# Patient Record
Sex: MALE | Race: OTHER | URBAN - METROPOLITAN AREA
[De-identification: names, ages, dates, MRNs, and addresses within clinical notes are randomized per-mention and may not be internally consistent; named-entity substitution may affect disease eponyms.]

---

## 2021-06-22 ENCOUNTER — INPATIENT (INPATIENT)
Facility: HOSPITAL | Age: 31
LOS: 2 days | Discharge: HOME | End: 2021-06-25
Attending: INTERNAL MEDICINE | Admitting: INTERNAL MEDICINE
Payer: COMMERCIAL

## 2021-06-22 VITALS
RESPIRATION RATE: 20 BRPM | WEIGHT: 119.93 LBS | SYSTOLIC BLOOD PRESSURE: 142 MMHG | TEMPERATURE: 98 F | OXYGEN SATURATION: 95 % | HEART RATE: 94 BPM | HEIGHT: 65 IN | DIASTOLIC BLOOD PRESSURE: 92 MMHG

## 2021-06-22 DIAGNOSIS — Z90.49 ACQUIRED ABSENCE OF OTHER SPECIFIED PARTS OF DIGESTIVE TRACT: Chronic | ICD-10-CM

## 2021-06-22 LAB
ALBUMIN SERPL ELPH-MCNC: 3.9 G/DL — SIGNIFICANT CHANGE UP (ref 3.3–5)
ALP SERPL-CCNC: 96 U/L — SIGNIFICANT CHANGE UP (ref 40–120)
ALT FLD-CCNC: 145 U/L — HIGH (ref 12–78)
ANION GAP SERPL CALC-SCNC: 11 MMOL/L — SIGNIFICANT CHANGE UP (ref 5–17)
APPEARANCE UR: CLEAR — SIGNIFICANT CHANGE UP
AST SERPL-CCNC: 163 U/L — HIGH (ref 15–37)
BACTERIA # UR AUTO: NEGATIVE — SIGNIFICANT CHANGE UP
BASOPHILS # BLD AUTO: 0.04 K/UL — SIGNIFICANT CHANGE UP (ref 0–0.2)
BASOPHILS NFR BLD AUTO: 0.4 % — SIGNIFICANT CHANGE UP (ref 0–2)
BILIRUB SERPL-MCNC: 3.9 MG/DL — HIGH (ref 0.2–1.2)
BILIRUB UR-MCNC: NEGATIVE — SIGNIFICANT CHANGE UP
BUN SERPL-MCNC: 7 MG/DL — SIGNIFICANT CHANGE UP (ref 7–23)
CALCIUM SERPL-MCNC: 8.6 MG/DL — SIGNIFICANT CHANGE UP (ref 8.5–10.1)
CHLORIDE SERPL-SCNC: 96 MMOL/L — SIGNIFICANT CHANGE UP (ref 96–108)
CO2 SERPL-SCNC: 26 MMOL/L — SIGNIFICANT CHANGE UP (ref 22–31)
COLOR SPEC: YELLOW — SIGNIFICANT CHANGE UP
CREAT SERPL-MCNC: 0.66 MG/DL — SIGNIFICANT CHANGE UP (ref 0.5–1.3)
D DIMER BLD IA.RAPID-MCNC: <150 NG/ML DDU — SIGNIFICANT CHANGE UP
DIFF PNL FLD: NEGATIVE — SIGNIFICANT CHANGE UP
EOSINOPHIL # BLD AUTO: 0.22 K/UL — SIGNIFICANT CHANGE UP (ref 0–0.5)
EOSINOPHIL NFR BLD AUTO: 2.3 % — SIGNIFICANT CHANGE UP (ref 0–6)
EPI CELLS # UR: SIGNIFICANT CHANGE UP
GLUCOSE SERPL-MCNC: 93 MG/DL — SIGNIFICANT CHANGE UP (ref 70–99)
GLUCOSE UR QL: NEGATIVE MG/DL — SIGNIFICANT CHANGE UP
HCT VFR BLD CALC: 40.4 % — SIGNIFICANT CHANGE UP (ref 39–50)
HGB BLD-MCNC: 14.6 G/DL — SIGNIFICANT CHANGE UP (ref 13–17)
IMM GRANULOCYTES NFR BLD AUTO: 0.2 % — SIGNIFICANT CHANGE UP (ref 0–1.5)
KETONES UR-MCNC: ABNORMAL
LEUKOCYTE ESTERASE UR-ACNC: NEGATIVE — SIGNIFICANT CHANGE UP
LIDOCAIN IGE QN: 54 U/L — LOW (ref 73–393)
LYMPHOCYTES # BLD AUTO: 1.41 K/UL — SIGNIFICANT CHANGE UP (ref 1–3.3)
LYMPHOCYTES # BLD AUTO: 14.9 % — SIGNIFICANT CHANGE UP (ref 13–44)
MCHC RBC-ENTMCNC: 30.7 PG — SIGNIFICANT CHANGE UP (ref 27–34)
MCHC RBC-ENTMCNC: 36.1 GM/DL — HIGH (ref 32–36)
MCV RBC AUTO: 84.9 FL — SIGNIFICANT CHANGE UP (ref 80–100)
MONOCYTES # BLD AUTO: 0.54 K/UL — SIGNIFICANT CHANGE UP (ref 0–0.9)
MONOCYTES NFR BLD AUTO: 5.7 % — SIGNIFICANT CHANGE UP (ref 2–14)
NEUTROPHILS # BLD AUTO: 7.23 K/UL — SIGNIFICANT CHANGE UP (ref 1.8–7.4)
NEUTROPHILS NFR BLD AUTO: 76.5 % — SIGNIFICANT CHANGE UP (ref 43–77)
NITRITE UR-MCNC: NEGATIVE — SIGNIFICANT CHANGE UP
NRBC # BLD: 0 /100 WBCS — SIGNIFICANT CHANGE UP (ref 0–0)
PH UR: 7 — SIGNIFICANT CHANGE UP (ref 5–8)
PLATELET # BLD AUTO: 192 K/UL — SIGNIFICANT CHANGE UP (ref 150–400)
POTASSIUM SERPL-MCNC: 3.4 MMOL/L — LOW (ref 3.5–5.3)
POTASSIUM SERPL-SCNC: 3.4 MMOL/L — LOW (ref 3.5–5.3)
PROT SERPL-MCNC: 7.8 GM/DL — SIGNIFICANT CHANGE UP (ref 6–8.3)
PROT UR-MCNC: NEGATIVE MG/DL — SIGNIFICANT CHANGE UP
RBC # BLD: 4.76 M/UL — SIGNIFICANT CHANGE UP (ref 4.2–5.8)
RBC # FLD: 11.9 % — SIGNIFICANT CHANGE UP (ref 10.3–14.5)
RBC CASTS # UR COMP ASSIST: NEGATIVE /HPF — SIGNIFICANT CHANGE UP (ref 0–4)
SODIUM SERPL-SCNC: 133 MMOL/L — LOW (ref 135–145)
SP GR SPEC: 1.01 — SIGNIFICANT CHANGE UP (ref 1.01–1.02)
TROPONIN I SERPL-MCNC: 0.01 NG/ML — SIGNIFICANT CHANGE UP (ref 0.01–0.04)
UROBILINOGEN FLD QL: NEGATIVE MG/DL — SIGNIFICANT CHANGE UP
WBC # BLD: 9.46 K/UL — SIGNIFICANT CHANGE UP (ref 3.8–10.5)
WBC # FLD AUTO: 9.46 K/UL — SIGNIFICANT CHANGE UP (ref 3.8–10.5)
WBC UR QL: NEGATIVE — SIGNIFICANT CHANGE UP

## 2021-06-22 PROCEDURE — 71045 X-RAY EXAM CHEST 1 VIEW: CPT | Mod: 26

## 2021-06-22 PROCEDURE — 74177 CT ABD & PELVIS W/CONTRAST: CPT | Mod: 26,MA

## 2021-06-22 PROCEDURE — 99285 EMERGENCY DEPT VISIT HI MDM: CPT

## 2021-06-22 PROCEDURE — 76705 ECHO EXAM OF ABDOMEN: CPT | Mod: 26

## 2021-06-22 RX ORDER — SODIUM CHLORIDE 9 MG/ML
1000 INJECTION INTRAMUSCULAR; INTRAVENOUS; SUBCUTANEOUS ONCE
Refills: 0 | Status: COMPLETED | OUTPATIENT
Start: 2021-06-22 | End: 2021-06-22

## 2021-06-22 RX ORDER — KETOROLAC TROMETHAMINE 30 MG/ML
15 SYRINGE (ML) INJECTION ONCE
Refills: 0 | Status: DISCONTINUED | OUTPATIENT
Start: 2021-06-22 | End: 2021-06-22

## 2021-06-22 RX ORDER — FAMOTIDINE 10 MG/ML
20 INJECTION INTRAVENOUS ONCE
Refills: 0 | Status: COMPLETED | OUTPATIENT
Start: 2021-06-22 | End: 2021-06-22

## 2021-06-22 RX ADMIN — Medication 15 MILLIGRAM(S): at 22:45

## 2021-06-22 RX ADMIN — Medication 15 MILLIGRAM(S): at 22:00

## 2021-06-22 RX ADMIN — SODIUM CHLORIDE 1000 MILLILITER(S): 9 INJECTION INTRAMUSCULAR; INTRAVENOUS; SUBCUTANEOUS at 21:30

## 2021-06-22 RX ADMIN — FAMOTIDINE 20 MILLIGRAM(S): 10 INJECTION INTRAVENOUS at 21:04

## 2021-06-22 RX ADMIN — SODIUM CHLORIDE 1000 MILLILITER(S): 9 INJECTION INTRAMUSCULAR; INTRAVENOUS; SUBCUTANEOUS at 21:04

## 2021-06-22 RX ADMIN — Medication 30 MILLILITER(S): at 21:04

## 2021-06-22 NOTE — ED PROVIDER NOTE - CLINICAL SUMMARY MEDICAL DECISION MAKING FREE TEXT BOX
well appearing 30M with complaint of abdominal, chest pain. Pt w/ mild tenderness in the L jaquan abdomen. Overall low suspicion of dangerous acute pathology however will check blood work, CTAP. DDx includes cholecystitis, gallstones, ACS, pneumonia.

## 2021-06-22 NOTE — ED ADULT NURSE NOTE - NSIMPLEMENTINTERV_GEN_ALL_ED
Implemented All Universal Safety Interventions:  [place] to call system. Call bell, personal items and telephone within reach. Instruct patient to call for assistance. Room bathroom lighting operational. Non-slip footwear when patient is off stretcher. Physically safe environment: no spills, clutter or unnecessary equipment. Stretcher in lowest position, wheels locked, appropriate side rails in place.

## 2021-06-22 NOTE — ED PROVIDER NOTE - OBJECTIVE STATEMENT
30M pmhx gastritis, appendectomy, presenting for R sided abdominal pain associated with constipation and obstipation since yesterday. per pt has also noticed burning sensation when he urinates. Denies new sexual partners, penile discharge. Denies fevers. States that the abdominal pain is 8/10, sharp in nature, R jaquan-abdomen. On ROS pt also explains that for the last 3 hours he's had some sharp, L sided chest pain assoc w/ SOB; but non exertional.

## 2021-06-23 LAB
ALBUMIN SERPL ELPH-MCNC: 3.8 G/DL — SIGNIFICANT CHANGE UP (ref 3.3–5)
ALP SERPL-CCNC: 96 U/L — SIGNIFICANT CHANGE UP (ref 40–120)
ALT FLD-CCNC: 164 U/L — HIGH (ref 12–78)
ANION GAP SERPL CALC-SCNC: 9 MMOL/L — SIGNIFICANT CHANGE UP (ref 5–17)
APTT BLD: 30 SEC — SIGNIFICANT CHANGE UP (ref 27.5–35.5)
AST SERPL-CCNC: 192 U/L — HIGH (ref 15–37)
BASOPHILS # BLD AUTO: 0.04 K/UL — SIGNIFICANT CHANGE UP (ref 0–0.2)
BASOPHILS NFR BLD AUTO: 0.7 % — SIGNIFICANT CHANGE UP (ref 0–2)
BILIRUB DIRECT SERPL-MCNC: 0.41 MG/DL — HIGH (ref 0.05–0.2)
BILIRUB SERPL-MCNC: 4.1 MG/DL — HIGH (ref 0.2–1.2)
BUN SERPL-MCNC: 6 MG/DL — LOW (ref 7–23)
CALCIUM SERPL-MCNC: 8.4 MG/DL — LOW (ref 8.5–10.1)
CHLORIDE SERPL-SCNC: 100 MMOL/L — SIGNIFICANT CHANGE UP (ref 96–108)
CMV IGG FLD QL: >10 U/ML — HIGH
CMV IGG SERPL-IMP: POSITIVE
CMV IGM FLD-ACNC: <8 AU/ML — SIGNIFICANT CHANGE UP
CMV IGM SERPL QL: NEGATIVE — SIGNIFICANT CHANGE UP
CO2 SERPL-SCNC: 26 MMOL/L — SIGNIFICANT CHANGE UP (ref 22–31)
CREAT SERPL-MCNC: 0.66 MG/DL — SIGNIFICANT CHANGE UP (ref 0.5–1.3)
EBV EA AB SER IA-ACNC: <5 U/ML — SIGNIFICANT CHANGE UP
EBV EA AB TITR SER IF: POSITIVE
EBV EA IGG SER-ACNC: NEGATIVE — SIGNIFICANT CHANGE UP
EBV NA IGG SER IA-ACNC: >600 U/ML — HIGH
EBV PATRN SPEC IB-IMP: SIGNIFICANT CHANGE UP
EBV VCA IGG AVIDITY SER QL IA: POSITIVE
EBV VCA IGM SER IA-ACNC: 208 U/ML — HIGH
EBV VCA IGM SER IA-ACNC: <10 U/ML — SIGNIFICANT CHANGE UP
EBV VCA IGM TITR FLD: NEGATIVE — SIGNIFICANT CHANGE UP
EOSINOPHIL # BLD AUTO: 0.34 K/UL — SIGNIFICANT CHANGE UP (ref 0–0.5)
EOSINOPHIL NFR BLD AUTO: 6 % — SIGNIFICANT CHANGE UP (ref 0–6)
GLUCOSE BLDC GLUCOMTR-MCNC: 92 MG/DL — SIGNIFICANT CHANGE UP (ref 70–99)
GLUCOSE SERPL-MCNC: 91 MG/DL — SIGNIFICANT CHANGE UP (ref 70–99)
HAV IGM SER-ACNC: SIGNIFICANT CHANGE UP
HBV CORE IGM SER-ACNC: SIGNIFICANT CHANGE UP
HBV SURFACE AG SER-ACNC: SIGNIFICANT CHANGE UP
HCT VFR BLD CALC: 40.1 % — SIGNIFICANT CHANGE UP (ref 39–50)
HCV AB S/CO SERPL IA: 0.16 S/CO — SIGNIFICANT CHANGE UP (ref 0–0.99)
HCV AB SERPL-IMP: SIGNIFICANT CHANGE UP
HGB BLD-MCNC: 14.3 G/DL — SIGNIFICANT CHANGE UP (ref 13–17)
IMM GRANULOCYTES NFR BLD AUTO: 0.2 % — SIGNIFICANT CHANGE UP (ref 0–1.5)
INR BLD: 1.15 RATIO — SIGNIFICANT CHANGE UP (ref 0.88–1.16)
LYMPHOCYTES # BLD AUTO: 1.17 K/UL — SIGNIFICANT CHANGE UP (ref 1–3.3)
LYMPHOCYTES # BLD AUTO: 20.5 % — SIGNIFICANT CHANGE UP (ref 13–44)
MAGNESIUM SERPL-MCNC: 2 MG/DL — SIGNIFICANT CHANGE UP (ref 1.6–2.6)
MCHC RBC-ENTMCNC: 30.4 PG — SIGNIFICANT CHANGE UP (ref 27–34)
MCHC RBC-ENTMCNC: 35.7 GM/DL — SIGNIFICANT CHANGE UP (ref 32–36)
MCV RBC AUTO: 85.1 FL — SIGNIFICANT CHANGE UP (ref 80–100)
MONOCYTES # BLD AUTO: 0.39 K/UL — SIGNIFICANT CHANGE UP (ref 0–0.9)
MONOCYTES NFR BLD AUTO: 6.8 % — SIGNIFICANT CHANGE UP (ref 2–14)
NEUTROPHILS # BLD AUTO: 3.76 K/UL — SIGNIFICANT CHANGE UP (ref 1.8–7.4)
NEUTROPHILS NFR BLD AUTO: 65.8 % — SIGNIFICANT CHANGE UP (ref 43–77)
NRBC # BLD: 0 /100 WBCS — SIGNIFICANT CHANGE UP (ref 0–0)
PHOSPHATE SERPL-MCNC: 2.8 MG/DL — SIGNIFICANT CHANGE UP (ref 2.5–4.5)
PLATELET # BLD AUTO: 162 K/UL — SIGNIFICANT CHANGE UP (ref 150–400)
POTASSIUM SERPL-MCNC: 3.3 MMOL/L — LOW (ref 3.5–5.3)
POTASSIUM SERPL-SCNC: 3.3 MMOL/L — LOW (ref 3.5–5.3)
PROT SERPL-MCNC: 7.4 GM/DL — SIGNIFICANT CHANGE UP (ref 6–8.3)
PROTHROM AB SERPL-ACNC: 13.3 SEC — SIGNIFICANT CHANGE UP (ref 10.6–13.6)
RAPID RVP RESULT: SIGNIFICANT CHANGE UP
RBC # BLD: 4.71 M/UL — SIGNIFICANT CHANGE UP (ref 4.2–5.8)
RBC # FLD: 12.1 % — SIGNIFICANT CHANGE UP (ref 10.3–14.5)
SARS-COV-2 RNA SPEC QL NAA+PROBE: SIGNIFICANT CHANGE UP
SODIUM SERPL-SCNC: 135 MMOL/L — SIGNIFICANT CHANGE UP (ref 135–145)
WBC # BLD: 5.71 K/UL — SIGNIFICANT CHANGE UP (ref 3.8–10.5)
WBC # FLD AUTO: 5.71 K/UL — SIGNIFICANT CHANGE UP (ref 3.8–10.5)

## 2021-06-23 PROCEDURE — 99204 OFFICE O/P NEW MOD 45 MIN: CPT

## 2021-06-23 PROCEDURE — 99244 OFF/OP CNSLTJ NEW/EST MOD 40: CPT

## 2021-06-23 PROCEDURE — 99223 1ST HOSP IP/OBS HIGH 75: CPT

## 2021-06-23 PROCEDURE — 74181 MRI ABDOMEN W/O CONTRAST: CPT | Mod: 26

## 2021-06-23 PROCEDURE — 90792 PSYCH DIAG EVAL W/MED SRVCS: CPT

## 2021-06-23 PROCEDURE — 12345: CPT | Mod: NC

## 2021-06-23 PROCEDURE — 99222 1ST HOSP IP/OBS MODERATE 55: CPT

## 2021-06-23 RX ORDER — PANTOPRAZOLE SODIUM 20 MG/1
40 TABLET, DELAYED RELEASE ORAL
Refills: 0 | Status: DISCONTINUED | OUTPATIENT
Start: 2021-06-23 | End: 2021-06-23

## 2021-06-23 RX ORDER — SIMETHICONE 80 MG/1
80 TABLET, CHEWABLE ORAL ONCE
Refills: 0 | Status: COMPLETED | OUTPATIENT
Start: 2021-06-23 | End: 2021-06-23

## 2021-06-23 RX ORDER — SUCRALFATE 1 G
1 TABLET ORAL ONCE
Refills: 0 | Status: COMPLETED | OUTPATIENT
Start: 2021-06-23 | End: 2021-06-23

## 2021-06-23 RX ORDER — FOLIC ACID 0.8 MG
1 TABLET ORAL DAILY
Refills: 0 | Status: DISCONTINUED | OUTPATIENT
Start: 2021-06-23 | End: 2021-06-25

## 2021-06-23 RX ORDER — ONDANSETRON 8 MG/1
4 TABLET, FILM COATED ORAL EVERY 6 HOURS
Refills: 0 | Status: DISCONTINUED | OUTPATIENT
Start: 2021-06-23 | End: 2021-06-25

## 2021-06-23 RX ORDER — MORPHINE SULFATE 50 MG/1
4 CAPSULE, EXTENDED RELEASE ORAL ONCE
Refills: 0 | Status: DISCONTINUED | OUTPATIENT
Start: 2021-06-23 | End: 2021-06-23

## 2021-06-23 RX ORDER — SODIUM CHLORIDE 9 MG/ML
1000 INJECTION INTRAMUSCULAR; INTRAVENOUS; SUBCUTANEOUS
Refills: 0 | Status: DISCONTINUED | OUTPATIENT
Start: 2021-06-23 | End: 2021-06-25

## 2021-06-23 RX ORDER — PANTOPRAZOLE SODIUM 20 MG/1
40 TABLET, DELAYED RELEASE ORAL
Refills: 0 | Status: DISCONTINUED | OUTPATIENT
Start: 2021-06-23 | End: 2021-06-25

## 2021-06-23 RX ORDER — SUCRALFATE 1 G
1 TABLET ORAL
Refills: 0 | Status: DISCONTINUED | OUTPATIENT
Start: 2021-06-23 | End: 2021-06-25

## 2021-06-23 RX ORDER — TRAZODONE HCL 50 MG
50 TABLET ORAL AT BEDTIME
Refills: 0 | Status: DISCONTINUED | OUTPATIENT
Start: 2021-06-23 | End: 2021-06-25

## 2021-06-23 RX ORDER — HALOPERIDOL DECANOATE 100 MG/ML
5 INJECTION INTRAMUSCULAR EVERY 6 HOURS
Refills: 0 | Status: DISCONTINUED | OUTPATIENT
Start: 2021-06-23 | End: 2021-06-25

## 2021-06-23 RX ORDER — SODIUM CHLORIDE 9 MG/ML
1000 INJECTION, SOLUTION INTRAVENOUS
Refills: 0 | Status: DISCONTINUED | OUTPATIENT
Start: 2021-06-23 | End: 2021-06-23

## 2021-06-23 RX ORDER — SUCRALFATE 1 G
1 TABLET ORAL ONCE
Refills: 0 | Status: DISCONTINUED | OUTPATIENT
Start: 2021-06-23 | End: 2021-06-23

## 2021-06-23 RX ORDER — POTASSIUM CHLORIDE 20 MEQ
20 PACKET (EA) ORAL ONCE
Refills: 0 | Status: COMPLETED | OUTPATIENT
Start: 2021-06-23 | End: 2021-06-23

## 2021-06-23 RX ORDER — POLYETHYLENE GLYCOL 3350 17 G/17G
17 POWDER, FOR SOLUTION ORAL DAILY
Refills: 0 | Status: DISCONTINUED | OUTPATIENT
Start: 2021-06-23 | End: 2021-06-25

## 2021-06-23 RX ORDER — THIAMINE MONONITRATE (VIT B1) 100 MG
100 TABLET ORAL DAILY
Refills: 0 | Status: COMPLETED | OUTPATIENT
Start: 2021-06-23 | End: 2021-06-25

## 2021-06-23 RX ADMIN — Medication 100 MILLIGRAM(S): at 13:13

## 2021-06-23 RX ADMIN — Medication 1 MILLIGRAM(S): at 06:16

## 2021-06-23 RX ADMIN — Medication 2 MILLIGRAM(S): at 22:49

## 2021-06-23 RX ADMIN — Medication 1 GRAM(S): at 13:07

## 2021-06-23 RX ADMIN — Medication 1 ENEMA: at 04:17

## 2021-06-23 RX ADMIN — Medication 50 MILLIGRAM(S): at 22:47

## 2021-06-23 RX ADMIN — MORPHINE SULFATE 4 MILLIGRAM(S): 50 CAPSULE, EXTENDED RELEASE ORAL at 02:17

## 2021-06-23 RX ADMIN — PANTOPRAZOLE SODIUM 40 MILLIGRAM(S): 20 TABLET, DELAYED RELEASE ORAL at 06:23

## 2021-06-23 RX ADMIN — Medication 1 MILLIGRAM(S): at 10:24

## 2021-06-23 RX ADMIN — SODIUM CHLORIDE 150 MILLILITER(S): 9 INJECTION INTRAMUSCULAR; INTRAVENOUS; SUBCUTANEOUS at 13:08

## 2021-06-23 RX ADMIN — SIMETHICONE 80 MILLIGRAM(S): 80 TABLET, CHEWABLE ORAL at 05:10

## 2021-06-23 RX ADMIN — Medication 1 GRAM(S): at 23:31

## 2021-06-23 RX ADMIN — Medication 1 MILLIGRAM(S): at 13:07

## 2021-06-23 RX ADMIN — POLYETHYLENE GLYCOL 3350 17 GRAM(S): 17 POWDER, FOR SOLUTION ORAL at 13:13

## 2021-06-23 RX ADMIN — MORPHINE SULFATE 4 MILLIGRAM(S): 50 CAPSULE, EXTENDED RELEASE ORAL at 02:45

## 2021-06-23 RX ADMIN — Medication 20 MILLIEQUIVALENT(S): at 05:11

## 2021-06-23 RX ADMIN — Medication 1 GRAM(S): at 18:58

## 2021-06-23 RX ADMIN — Medication 1 GRAM(S): at 03:11

## 2021-06-23 RX ADMIN — PANTOPRAZOLE SODIUM 40 MILLIGRAM(S): 20 TABLET, DELAYED RELEASE ORAL at 18:58

## 2021-06-23 NOTE — PROGRESS NOTE ADULT - ASSESSMENT
29 y/o male w/ PMHx of GERD presents to the ED c/o abdominal pain. Pt being admitted for abdominal pain, found to have elevated LFTs.  States his father  ~ 1 month ago.     # Elevated LFTs - Likely alcoholic hepatitis.  Will check Hep profile.  Bilirubin remains elevated.  Surgery input appreciated.  HIDA scan to r/o biliary obstruction.  ? GB wall thickening on CT / US.  Discussed with GI.  Monitor LFTs, coags.   # Rectosigmoid thickening - likely outpatient colonoscopy.  # Alcohol Abuse with withdrawal - counselled on cessation.  CIWA protocol.    # Hallucinations - may be due to above.  Psychiatry input requested.  # Acute Gastritis - continue PPI, sucralfate  # Depression - father  ~ 1 month ago.  Supportive care, see abov   # DVT Prophylaxis - OOB

## 2021-06-23 NOTE — BH CONSULTATION LIAISON ASSESSMENT NOTE - NSBHCHARTREVIEWVS_PSY_A_CORE FT
Vital Signs Last 24 Hrs  T(C): 37.1 (23 Jun 2021 09:33), Max: 37.1 (23 Jun 2021 09:33)  T(F): 98.8 (23 Jun 2021 09:33), Max: 98.8 (23 Jun 2021 09:33)  HR: 75 (23 Jun 2021 09:33) (75 - 94)  BP: 122/82 (23 Jun 2021 09:33) (122/82 - 148/72)  BP(mean): --  RR: 19 (23 Jun 2021 09:33) (16 - 20)  SpO2: 96% (23 Jun 2021 09:33) (95% - 98%)

## 2021-06-23 NOTE — PATIENT PROFILE ADULT - PRO INTERPRETER NEED 2
Anesthesia Evaluation      Patient summary reviewed     Airway   Mallampati: I  Neck ROM: full   Pulmonary - negative ROS and normal exam                          Cardiovascular - negative ROS and normal exam   Neuro/Psych    (+) depression,     Endo/Other - negative ROS      GI/Hepatic/Renal - negative ROS           Dental - normal exam                        Anesthesia Plan  Planned anesthetic: general endotracheal    ASA 2     Anesthetic plan and risks discussed with: patient               English

## 2021-06-23 NOTE — BH CONSULTATION LIAISON ASSESSMENT NOTE - RISK ASSESSMENT
Chronic risk factors: male gender, ongoing substance use. Protective factors: young; healthy; no psychiatric history; denies suicide attempts; no self-injurious behavior; no hx of aggression/violence; denies illicit drug use; no legal issues; motivated for help; articulate; + social support; access to health services. No acute risk factors identified

## 2021-06-23 NOTE — H&P ADULT - ASSESSMENT
31 y/o male w/ PMHx of GERD presents to the ED c/o abdominal pain. Pt being admitted for intractable abdominal pain due to gastritis    1) Acute Gastritis  - PPI BID  - clears and adv as tolerated  - c/w sucralfate as well  - Zofran prn    2) Elevated LFT  - Hyperbilirubinemia, mainly indirect and due to hepatocellular injury from alcohol abuse  - CT A/P and RUQ US as above  - cont to monitor  - pt counseled on alcohol cessation  - CT w/ rectosigmoid thickening, outpt GI eval for colonoscopy    3) Depression  - pt reports SI due to anniversary of father's death, states he will not actually attempt to hurt himself and refusing to see Psych  - does not recall the name of his medication  - pt advised to obtain Psychiatrist    4) Alcohol abuse  - pt feeling anxious in ED, showing mild sns of withdrawal  - place on CIWA  - thiamine, folic acid  - IVF  - cont to monitor    5) DVT ppx - low risk, ambulate

## 2021-06-23 NOTE — BH CONSULTATION LIAISON ASSESSMENT NOTE - NSBHCHARTREVIEWLAB_PSY_A_CORE FT
06-23    135  |  100  |  6<L>  ----------------------------<  91  3.3<L>   |  26  |  0.66    Ca    8.4<L>      23 Jun 2021 05:34  Phos  2.8     06-23  Mg     2.0     06-23    TPro  7.4  /  Alb  3.8  /  TBili  4.1<H>  /  DBili  x   /  AST  192<H>  /  ALT  164<H>  /  AlkPhos  96  06-23

## 2021-06-23 NOTE — BH CONSULTATION LIAISON ASSESSMENT NOTE - NSBHCONSULTSUBSTANCEALCOHOL_PSY_A_CORE FT
symptom triggered CIWA sufficent  start Ativan 1mg Po in am, in the afternoon and 2mg PO qhs; then continue to taper down & cover with symptom triggered CIWA  start Ativan 1mg Po in am, in the afternoon and 2mg PO qhs; then continue to taper down & cover with symptom triggered CIWA. Discussed plan of care with Patient who was in agreement

## 2021-06-23 NOTE — CONSULT NOTE ADULT - SUBJECTIVE AND OBJECTIVE BOX
GENERAL SURGERY CONSULT NOTE    Patient is a 30y old  Male who presents with a chief complaint of right sided abdominal pain.    HPI: 31 y/o male PMHx GERD, gastritis, open appendectomy 5 years ago c/o right-sided abdominal pain since . Had multiple episodes of NBNB emesis. +Chills. Reports that he had an endoscopy at [facility] 4 months ago that showed gastritis. Patient denies fever, constipation, diarrhea, melena, hematochezia, dysuria, hematuria, chest pain, shortness of breath, dizziness, cough. Patient denies prior incident.     REVIEW OF SYSTEMS:  CONSTITUTIONAL: No fever, weight loss, or fatigue  EYES: No eye pain, visual disturbances, discharge  ENMT:  No difficulty hearing, tinnitus, vertigo; No sinus or throat pain  NECK: No pain or stiffness  BREASTS: No pain, masses, or nipple discharge  RESPIRATORY: No cough, wheezing, chills or hemoptysis; No shortness of breath  CARDIOVASCULAR: No chest pain, palpitations, dizziness, or leg swelling  GASTROINTESTINAL: No abdominal or epigastric pain. No nausea, vomiting, or hematemesis; No diarrhea or constipation. No melena or hematochezia.  GENITOURINARY: No dysuria, frequency, hematuria, or incontinence  NEUROLOGICAL: No headaches, memory loss, loss of strength, numbness, or tremors  SKIN: No itching, burning, rashes, or lesions   LYMPH NODES: No enlarged glands  ENDOCRINE: No heat or cold intolerance; No hair loss  MUSCULOSKELETAL: No joint pain or swelling; No muscle, back, or extremity pain  PSYCHIATRIC: No depression, anxiety, mood swings, or difficulty sleeping  HEME/LYMPH: No easy bruising, or bleeding gums  ALLERY AND IMMUNOLOGIC: No hives or eczema     PAST MEDICAL & SURGICAL HISTORY:  Gastritis  Acid reflux  History of appendectomy 5 years ago at [facility]    MEDICATIONS: Omeprazole 40mg daily    Allergies  No Known Allergies    SOCIAL HISTORY: Drank vodka Thursday, Friday, and Saturday. Reports that prior to those 3 days, he hadn't drank for 2 months. Denies smoking or drugs.           FAMILY HISTORY: Cancer (father)      Vital Signs Last 24 Hrs  T(C): 36.6 (2021 20:16), Max: 36.6 (2021 20:16)  T(F): 97.9 (2021 20:16), Max: 97.9 (2021 20:16)  HR: 94 (2021 20:16) (94 - 94)  BP: 142/92 (2021 20:16) (142/92 - 142/92)  BP(mean): --  RR: 20 (2021 20:16) (20 - 20)  SpO2: 95% (2021 20:16) (95% - 95%)    PHYSICAL EXAM:  GENERAL: NAD, well-developed  HEAD:  Atraumatic, Normocephalic  EYES: Conjunctiva and sclera clear  ENMT: No tonsillar erythema, exudates, or enlargement; Moist mucous membranes, No lesions  NECK: Supple, No JVD, Normal thyroid  NERVOUS SYSTEM:  Alert & Oriented X3  CHEST/LUNG: Clear to auscultation bilaterally; No rales, rhonchi, wheezing  HEART: Regular rate and rhythm. S1S2  ABDOMEN: Nondistended, +BS, soft, mild right-sided and epigastric tenderness, no guarding, no rigidity   EXTREMITIES:  2+ Peripheral Pulses, No clubbing, cyanosis, or edema       LABS:                        14.6   9.46  )-----------( 192      ( 2021 21:18 )             40.4     06    133<L>  |  96  |  7   ----------------------------<  93  3.4<L>   |  26  |  0.66    Ca    8.6      2021 21:18    TPro  7.8  /  Alb  3.9  /  TBili  3.9<H>  /  DBili  .41<H>  /  AST  163<H>  /  ALT  145<H>  /  AlkPhos  96  -22      Urinalysis Basic - ( 2021 21:18 )  Color: Yellow / Appearance: Clear / S.010 / pH: x  Gluc: x / Ketone: Trace  / Bili: Negative / Urobili: Negative mg/dL   Blood: x / Protein: Negative mg/dL / Nitrite: Negative   Leuk Esterase: Negative / RBC: Negative /HPF / WBC Negative   Sq Epi: x / Non Sq Epi: Occasional / Bacteria: Negative      < from: US Gallbladder (US Gallbladder .) (21 @ 23:40) >  FINDINGS:  GALLBLADDER: No gallstones or pericholecystic fluid. Nonspecific borderline gallbladder wall thickening. No sonographic Davis's sign.  CBD: Normal caliber, 3 mm  IMPRESSION:  No cholelithiasis. Nonspecific borderline gallbladder wall thickening.  < end of copied text >    < from: CT Abdomen and Pelvis w/ IV Cont (21 @ 22:22) >  *** ADDENDUM 2021  ***    The patient was recommended a colonoscopy to further evaluate the asymmetric wall thickening at the rectosigmoid junction.    The findings were discussed with Dr. SAPP  on 2021 12:26 AM.  Hospital policies for call back including read back policies were followed. The verbal communication call back supplements this written report.    *** END OF ADDENDUM 2021  ***      PROCEDURE DATE:  2021         INTERPRETATION:  CLINICAL INFORMATION: Abdominal pain    COMPARISON: There are no prior studies available for comparison.      TECHNIQUE: CT imaging of the abdomen and pelvis was performed with IV contrast.    CONTRAST/COMPLICATIONS:  IV Contrast: Omnipaque 350  75 cc administered   25 cc discarded  Oral Contrast: NONE  Complications: None reported at time of study completion    FINDINGS:    LOWER CHEST: within normal limits.    ABDOMEN:  LIVER: Diffuse hepatic steatosis.  BILE DUCTS: normal caliber.  GALLBLADDER: No calcified gallstones. Normal caliber wall.  PANCREAS: within normal limits.  SPLEEN: within normal limits.  ADRENALS: within normal limits.  KIDNEYS/URETERS: within normal limits.    PELVIS:  REPRODUCTIVE ORGANS: no pelvic masses.  BLADDER: within normal limits.      BOWEL: Portions of the gastrointestinal tract are collapsed, limiting evaluation. Asymmetric left-sided wall thickening of the rectosigmoid junction (sequence 2, image 121). No small bowel obstruction. The appendix is surgically absent.  PERITONEUM: no ascites or free air, no fluid collection.  VESSELS: Atherosclerotic changes  RETROPERITONEUM: within normal limits.  ABDOMINAL WALL: within normal limits.  BONES: within normal limits.    IMPRESSION:    Asymmetric left-sided wall thickening of the rectosigmoid junction. Correlation with colonoscopy suggested suggested for further evaluation. No small bowel obstruction.    Diffuse hepatic steatosis.    < end of copied text >

## 2021-06-23 NOTE — H&P ADULT - NSHPLABSRESULTS_GEN_ALL_CORE
T(C): 36.7 (21 @ 08:32), Max: 36.8 (21 @ 04:16)  HR: 87 (21 @ 08:32) (77 - 94)  BP: 145/81 (21 @ 08:32) (142/92 - 148/72)  RR: 20 (21 @ 08:32) (16 - 20)  SpO2: 98% (21 @ 08:32) (95% - 98%)                        14.3   5.71  )-----------( 162      ( 2021 05:34 )             40.1         135  |  100  |  6<L>  ----------------------------<  91  3.3<L>   |  26  |  0.66    Ca    8.4<L>      2021 05:34  Phos  2.8       Mg     2.0         TPro  7.4  /  Alb  3.8  /  TBili  4.1<H>  /  DBili  x   /  AST  192<H>  /  ALT  164<H>  /  AlkPhos  96      LIVER FUNCTIONS - ( 2021 05:34 )  Alb: 3.8 g/dL / Pro: 7.4 gm/dL / ALK PHOS: 96 U/L / ALT: 164 U/L / AST: 192 U/L / GGT: x             Urinalysis Basic - ( 2021 21:18 )    Color: Yellow / Appearance: Clear / S.010 / pH: x  Gluc: x / Ketone: Trace  / Bili: Negative / Urobili: Negative mg/dL   Blood: x / Protein: Negative mg/dL / Nitrite: Negative   Leuk Esterase: Negative / RBC: Negative /HPF / WBC Negative   Sq Epi: x / Non Sq Epi: Occasional / Bacteria: Negative      < from: CT Abdomen and Pelvis w/ IV Cont (21 @ 22:22) >    IMPRESSION:    Asymmetric left-sided wall thickening of the rectosigmoid junction. Correlation with colonoscopy suggested suggested for further evaluation. No small bowel obstruction.  Diffuse hepatic steatosis.    < from: US Gallbladder (US Gallbladder .) (21 @ 23:40) >    IMPRESSION:  No cholelithiasis. Nonspecific borderline gallbladder wall thickening.    < end of copied text >      aluminum hydroxide/magnesium hydroxide/simethicone Suspension 30 milliLiter(s) Oral every 4 hours PRN  folic acid 1 milliGRAM(s) Oral daily  LORazepam   Injectable 1 milliGRAM(s) IV Push every 2 hours PRN  LORazepam   Injectable 1 milliGRAM(s) IV Push every 1 hour PRN  ondansetron Injectable 4 milliGRAM(s) IV Push every 6 hours PRN  pantoprazole    Tablet 40 milliGRAM(s) Oral before breakfast  polyethylene glycol 3350 17 Gram(s) Oral daily  sodium chloride 0.9%. 1000 milliLiter(s) IV Continuous <Continuous>  sucralfate 1 Gram(s) Oral four times a day  thiamine 100 milliGRAM(s) Oral daily

## 2021-06-23 NOTE — PROGRESS NOTE ADULT - SUBJECTIVE AND OBJECTIVE BOX
Patient: GUIDO JETT [id number] 30y Male                            Hospitalist Attending Note    Seen with RN.  c/o hearing voices that are threatening.  States he is aware they are not real.  Denies suicidal thoughts.  Denies feeling threatened by any physical contacts.  No Abdominal pain, nausea, vomiting, diarrhea, nor constipation.   Still with poor appetite.     ____________________PHYSICAL EXAM:  GENERAL:  NAD Alert and Oriented x 3   HEENT: NCAT  CARDIOVASCULAR:  S1, S2  LUNGS: CTAB  ABDOMEN:  soft, (-) tenderness, (-) distension, (+) bowel sounds, (-) guarding, (-) rebound (-) rigidity  EXTREMITIES:  no cyanosis / clubbing / edema.   ____________________     VITALS:  Vital Signs Last 24 Hrs  T(C): 37.1 (2021 09:33), Max: 37.1 (2021 09:33)  T(F): 98.8 (2021 09:33), Max: 98.8 (2021 09:33)  HR: 75 (2021 09:33) (75 - 94)  BP: 122/82 (2021 09:33) (122/82 - 148/72)  BP(mean): --  RR: 19 (2021 09:33) (16 - 20)  SpO2: 96% (2021 09:33) (95% - 98%) Daily Height in cm: 167.64 (2021 09:33)    Daily   CAPILLARY BLOOD GLUCOSE      POCT Blood Glucose.: 92 mg/dL (2021 05:16)    I&O's Summary      HISTORY:  PAST MEDICAL & SURGICAL HISTORY:  Gastritis    Acid reflux    History of appendectomy    Allergies    No Known Allergies    Intolerances       LABS:                        14.3   5.71  )-----------( 162      ( 2021 05:34 )             40.1     06-    135  |  100  |  6<L>  ----------------------------<  91  3.3<L>   |  26  |  0.66    Ca    8.4<L>      2021 05:34  Phos  2.8     -  Mg     2.0     -    TPro  7.4  /  Alb  3.8  /  TBili  4.1<H>  /  DBili  x   /  AST  192<H>  /  ALT  164<H>  /  AlkPhos  96  -23    PT/INR - ( 2021 10:42 )   PT: 13.3 sec;   INR: 1.15 ratio         PTT - ( 2021 10:42 )  PTT:30.0 sec  LIVER FUNCTIONS - ( 2021 05:34 )  Alb: 3.8 g/dL / Pro: 7.4 gm/dL / ALK PHOS: 96 U/L / ALT: 164 U/L / AST: 192 U/L / GGT: x           Urinalysis Basic - ( 2021 21:18 )    Color: Yellow / Appearance: Clear / S.010 / pH: x  Gluc: x / Ketone: Trace  / Bili: Negative / Urobili: Negative mg/dL   Blood: x / Protein: Negative mg/dL / Nitrite: Negative   Leuk Esterase: Negative / RBC: Negative /HPF / WBC Negative   Sq Epi: x / Non Sq Epi: Occasional / Bacteria: Negative      CARDIAC MARKERS ( 2021 21:18 )  .015 ng/mL / x     / x     / x     / x              MEDICATIONS:  MEDICATIONS  (STANDING):  folic acid 1 milliGRAM(s) Oral daily  pantoprazole    Tablet 40 milliGRAM(s) Oral two times a day  polyethylene glycol 3350 17 Gram(s) Oral daily  sodium chloride 0.9%. 1000 milliLiter(s) (150 mL/Hr) IV Continuous <Continuous>  sucralfate 1 Gram(s) Oral four times a day  thiamine 100 milliGRAM(s) Oral daily    MEDICATIONS  (PRN):  aluminum hydroxide/magnesium hydroxide/simethicone Suspension 30 milliLiter(s) Oral every 4 hours PRN Dyspepsia  LORazepam   Injectable 1 milliGRAM(s) IV Push every 2 hours PRN CIWA-Ar score increase by 2 points and a total score of 7 or less  LORazepam   Injectable 1 milliGRAM(s) IV Push every 1 hour PRN CIWA-Ar score 8 or greater  ondansetron Injectable 4 milliGRAM(s) IV Push every 6 hours PRN Nausea and/or Vomiting

## 2021-06-23 NOTE — BH CONSULTATION LIAISON ASSESSMENT NOTE - CURRENT MEDICATION
MEDICATIONS  (STANDING):  folic acid 1 milliGRAM(s) Oral daily  pantoprazole    Tablet 40 milliGRAM(s) Oral two times a day  polyethylene glycol 3350 17 Gram(s) Oral daily  sodium chloride 0.9%. 1000 milliLiter(s) (150 mL/Hr) IV Continuous <Continuous>  sucralfate 1 Gram(s) Oral four times a day  thiamine 100 milliGRAM(s) Oral daily    MEDICATIONS  (PRN):  aluminum hydroxide/magnesium hydroxide/simethicone Suspension 30 milliLiter(s) Oral every 4 hours PRN Dyspepsia  LORazepam   Injectable 1 milliGRAM(s) IV Push every 2 hours PRN CIWA-Ar score increase by 2 points and a total score of 7 or less  LORazepam   Injectable 1 milliGRAM(s) IV Push every 1 hour PRN CIWA-Ar score 8 or greater  ondansetron Injectable 4 milliGRAM(s) IV Push every 6 hours PRN Nausea and/or Vomiting

## 2021-06-23 NOTE — BH CONSULTATION LIAISON ASSESSMENT NOTE - SUMMARY
31 yo male with binge pattern Alcohol Use Disorder, moderate, manifesting some alcohol withdrawal symptoms for which treatment is needed.  31 yo male with binge pattern Alcohol Use Disorder, moderate, manifesting some alcohol withdrawal symptoms for which treatment is needed.   - currently has capacity to make his medical decisions.

## 2021-06-23 NOTE — CONSULT NOTE ADULT - ASSESSMENT
29 y/o male PMHx GERD, gastritis, open appendectomy 5 years ago c/o right-sided abdominal pain since Sunday.  US showed No cholelithiasis. Nonspecific borderline gallbladder wall thickening.  CT showed Asymmetric left-sided wall thickening of the rectosigmoid junction. Diffuse hepatic steatosis.  VSS. WBC:9.46. Bili:3.9. Elevated AST and ALT.    Plan:  Recommend GI consult. MRCP/ERCP per GI  Medical management  Will d/w attending  29 y/o male PMHx GERD, gastritis, open appendectomy 5 years ago c/o right-sided abdominal pain since Sunday.  US showed No cholelithiasis. Nonspecific borderline gallbladder wall thickening.  CT showed Asymmetric left-sided wall thickening of the rectosigmoid junction. Diffuse hepatic steatosis.  VSS. WBC:9.46. Bili:3.9. Elevated AST and ALT.    Plan:  Recommend GI consult. MRCP/ERCP per GI  NPO, IV hydration  Medical management  Will d/w attending

## 2021-06-23 NOTE — H&P ADULT - NSHPPHYSICALEXAM_GEN_ALL_CORE
PHYSICAL EXAM:    Vital Signs Last 24 Hrs  T(C): 36.7 (23 Jun 2021 08:32), Max: 36.8 (23 Jun 2021 04:16)  T(F): 98.1 (23 Jun 2021 08:32), Max: 98.2 (23 Jun 2021 04:16)  HR: 87 (23 Jun 2021 08:32) (77 - 94)  BP: 145/81 (23 Jun 2021 08:32) (142/92 - 148/72)  BP(mean): --  RR: 20 (23 Jun 2021 08:32) (16 - 20)  SpO2: 98% (23 Jun 2021 08:32) (95% - 98%)    GENERAL: Pt lying in bed comfortably in NAD  HEENT:  Atraumatic, EOMI, PERRL, mild sclera icterus, MMM  NECK: Supple  CHEST/LUNG: Clear to auscultation bilaterally. Unlabored respirations  HEART: Regular rate and rhythm;  ABDOMEN: Bowel sounds present; Soft, tender epigastrium and RUQ, Nondistended. No guarding or rigidity    EXTREMITIES:  2+ Peripheral Pulses,  No edema  NEUROLOGICAL:  Alert & Oriented X3,. No deficits   MSK: FROM x 4 extremities   SKIN: No rashes or lesions

## 2021-06-23 NOTE — CONSULT NOTE ADULT - ATTENDING COMMENTS
I saw and examined the patient who stated that he had felt better since admission and on my exam had minimal abdominal tenderness. I reviewed imaging and laboratory findings. Patient's symptoms unlikely to be related to gallbladder given lack of gallstones, elevation in LFTs likely due to drinking history. GI evaluation for thickening in the colon. No acute surgical intervention at this time.

## 2021-06-23 NOTE — H&P ADULT - HISTORY OF PRESENT ILLNESS
29 y/o male w/ PMHx of GERD presents to the ED c/o abdominal pain. Pt reports nausea and nonbilious/nonbloody emesis began 2 days ago then he noted epigastric as well as RUQ abdominal pain, which has mainly been constant. Pt also c/o feeling constipated, last BM 4 days. Of note pt reports 2 weeks ago was the anniversary of his Father's death and for 12 days straight drank about 1L of vodka daily because he has been very depressed. Pt reports SI however states he has not been drinking to kill himself, just "trying to help with the anxiness". Pt reports he is on antidepressants which he stopped taking when he started drinking. Pt reports this has happened in the past; has had periods of binge drinking when his mood get bad then stops when he starts feeling better. Pt reports last drink 2 days ago as he has been unable to keep anything down.    In ED, initial vitals stable      31 y/o male w/ PMHx of GERD presents to the ED c/o abdominal pain. Pt reports nausea and nonbilious/nonbloody emesis began 2 days ago then he noted epigastric as well as RUQ abdominal pain, which has mainly been constant. Pt also c/o feeling constipated, last BM 4 days. Of note pt reports 2 weeks ago was the anniversary of his Father's death and for 12 days straight drank about 1L of vodka daily because he has been very depressed. Pt reports SI however states he has not been drinking to kill himself, just "trying to help with the anxiness". Pt reports he is on antidepressants which he stopped taking when he started drinking. Pt reports this has happened in the past; has had periods of binge drinking when his mood get bad then stops when he starts feeling better. Pt reports last drink 2 days ago as he has been unable to keep anything down.       In ED, initial vitals stable. Labs sig for Na 132, K 3.4, TB 3.9, AST//145, D Bili 0.41. CT A/P Asymmetric left-sided wall thickening of the rectosigmoid junction. Correlation with colonoscopy suggested suggested for further evaluation. No small bowel obstruction. Diffuse hepatic steatosis.       31 y/o male w/ PMHx of GERD presents to the ED c/o abdominal pain. Pt reports nausea and nonbilious/nonbloody emesis began 2 days ago then he noted epigastric as well as RUQ abdominal pain, which has mainly been constant. Pt also c/o feeling constipated, last BM 4 days. Of note pt reports 2 weeks ago was the anniversary of his Father's death and for 12 days straight drank about 1L of vodka daily because he has been very depressed. Pt reports SI however states he has not been drinking to kill himself, just "trying to help with the anxiness". Pt reports he is on antidepressants which he stopped taking when he started drinking. Pt reports this has happened in the past; has had periods of binge drinking when his mood get bad then stops when he starts feeling better. Pt reports last drink 2 days ago as he has been unable to keep anything down.       In ED, initial vitals stable. Labs sig for Na 132, K 3.4, TB 3.9, AST//145, D Bili 0.41. CT A/P Asymmetric left-sided wall thickening of the rectosigmoid junction. Correlation with colonoscopy suggested for further evaluation. No small bowel obstruction. Diffuse hepatic steatosis. RUQ w/ nonsp GB wall thickening. Surgery consulted.

## 2021-06-23 NOTE — BH CONSULTATION LIAISON ASSESSMENT NOTE - HPI (INCLUDE ILLNESS QUALITY, SEVERITY, DURATION, TIMING, CONTEXT, MODIFYING FACTORS, ASSOCIATED SIGNS AND SYMPTOMS)
29yo  M, with hx of Alcohol use, rule out misuse, with Binge pattern, with recent relapse after a period of sobriety, + anniversary of father's death, who presented to the ED yesterday with right sided abdominal pain associated with constipation x 1 day. since yesterday. + burning sensation when he urinates. CT read as Asymmetric left-sided wall thickening of the rectosigmoid junction. Correlation with colonoscopy suggested for further evaluation. BAL, serum tox and urine tox not done on admission. Elevation in bilirubin, LFTs. Placed on symptom-triggered CIWA; received 2 doses today.     ISTOP Reference #: [id number] no record found       31yo  M, with hx of Alcohol use, rule out misuse, with Binge pattern, with recent relapse after a period of sobriety, + anniversary of father's death, who presented to the ED yesterday with right sided abdominal pain associated with constipation x 1 day. since yesterday. + burning sensation when he urinates. CT read as Asymmetric left-sided wall thickening of the rectosigmoid junction. Correlation with colonoscopy suggested for further evaluation. BAL, serum tox and urine tox not done on admission. Elevation in bilirubin, LFTs. Placed on symptom-triggered CIWA; received 2 doses today.     Staff reports that he had temporary short episodes of agitation when he said he has "delirium tremens" and expressed paranoia. He got Ativan, after which his symptoms subsided and Patient slept, then was calm, cooperative without any reported paranoia and no observed agitation.     ISTOP Reference #: [id number] no record found       29yo  M, with hx of Alcohol use, rule out misuse, with Binge pattern, with recent relapse after a period of sobriety, + anniversary of father's death, who presented to the ED yesterday with right sided abdominal pain associated with constipation x 1 day. since yesterday. + burning sensation when he urinates. CT read as Asymmetric left-sided wall thickening of the rectosigmoid junction. Correlation with colonoscopy suggested for further evaluation. BAL, serum tox and urine tox not done on admission. Elevation in bilirubin, LFTs. Placed on symptom-triggered CIWA; received 2 doses today.     Staff reports that he had temporary short episodes of agitation when he said he has "delirium tremens" and expressed paranoia. He got Ativan, after which his symptoms subsided and Patient slept, then was calm, cooperative without any reported paranoia and no observed agitation.     EXAM: seen downstairs after MRI imaging. patient is calm, cooperative, engages appropriately. Does not manifest or report any acute alcohol withdrawal sxs. Patient states that he has a long history of hearing nonspecific voices, noncommand, passive threats, about 2 days after he stops drinking after a binge period.  The "pill" makes it go away (Ativan). Patient is able to reality test. Denies any isolated incidents of perceptual distortions outside substance use periods. Endorses stable euthymic mood at this time and denies any symptoms of hypomania/tonia/psychosis/major depression/ anxiety/panic. Denies any active or passive suicidal or homicidal ideation. Names protective factors (deepa; family; hope for future). Denies illicit drug use and denies access to guns.     ISTOP Reference #: [id number] no record found

## 2021-06-23 NOTE — BH CONSULTATION LIAISON ASSESSMENT NOTE - NSSUICPROTFACT_PSY_ALL_CORE
Responsibility to children, family, or others/Identifies reasons for living/Supportive social network of family or friends/Fear of death or the actual act of killing self/Cultural, spiritual and/or moral attitudes against suicide/Roman [employer] beliefs

## 2021-06-24 LAB
ALBUMIN SERPL ELPH-MCNC: 3.4 G/DL — SIGNIFICANT CHANGE UP (ref 3.3–5)
ALP SERPL-CCNC: 98 U/L — SIGNIFICANT CHANGE UP (ref 40–120)
ALT FLD-CCNC: 203 U/L — HIGH (ref 12–78)
ANION GAP SERPL CALC-SCNC: 8 MMOL/L — SIGNIFICANT CHANGE UP (ref 5–17)
AST SERPL-CCNC: 206 U/L — HIGH (ref 15–37)
BILIRUB SERPL-MCNC: 2.7 MG/DL — HIGH (ref 0.2–1.2)
BUN SERPL-MCNC: 8 MG/DL — SIGNIFICANT CHANGE UP (ref 7–23)
CALCIUM SERPL-MCNC: 8.2 MG/DL — LOW (ref 8.5–10.1)
CHLORIDE SERPL-SCNC: 105 MMOL/L — SIGNIFICANT CHANGE UP (ref 96–108)
CO2 SERPL-SCNC: 24 MMOL/L — SIGNIFICANT CHANGE UP (ref 22–31)
COVID-19 SPIKE DOMAIN AB INTERP: POSITIVE
COVID-19 SPIKE DOMAIN ANTIBODY RESULT: 117 U/ML — HIGH
CREAT SERPL-MCNC: 0.62 MG/DL — SIGNIFICANT CHANGE UP (ref 0.5–1.3)
CULTURE RESULTS: SIGNIFICANT CHANGE UP
GLUCOSE SERPL-MCNC: 76 MG/DL — SIGNIFICANT CHANGE UP (ref 70–99)
INR BLD: 1.16 RATIO — SIGNIFICANT CHANGE UP (ref 0.88–1.16)
POTASSIUM SERPL-MCNC: 3.6 MMOL/L — SIGNIFICANT CHANGE UP (ref 3.5–5.3)
POTASSIUM SERPL-SCNC: 3.6 MMOL/L — SIGNIFICANT CHANGE UP (ref 3.5–5.3)
PROT SERPL-MCNC: 6.9 GM/DL — SIGNIFICANT CHANGE UP (ref 6–8.3)
PROTHROM AB SERPL-ACNC: 13.4 SEC — SIGNIFICANT CHANGE UP (ref 10.6–13.6)
SARS-COV-2 IGG+IGM SERPL QL IA: 117 U/ML — HIGH
SARS-COV-2 IGG+IGM SERPL QL IA: POSITIVE
SODIUM SERPL-SCNC: 137 MMOL/L — SIGNIFICANT CHANGE UP (ref 135–145)
SPECIMEN SOURCE: SIGNIFICANT CHANGE UP

## 2021-06-24 PROCEDURE — 99231 SBSQ HOSP IP/OBS SF/LOW 25: CPT

## 2021-06-24 PROCEDURE — 93010 ELECTROCARDIOGRAM REPORT: CPT

## 2021-06-24 PROCEDURE — 78226 HEPATOBILIARY SYSTEM IMAGING: CPT | Mod: 26

## 2021-06-24 PROCEDURE — 99232 SBSQ HOSP IP/OBS MODERATE 35: CPT

## 2021-06-24 RX ADMIN — Medication 1 MILLIGRAM(S): at 17:40

## 2021-06-24 RX ADMIN — Medication 1 MILLIGRAM(S): at 21:15

## 2021-06-24 RX ADMIN — Medication 1 GRAM(S): at 05:51

## 2021-06-24 RX ADMIN — POLYETHYLENE GLYCOL 3350 17 GRAM(S): 17 POWDER, FOR SOLUTION ORAL at 17:39

## 2021-06-24 RX ADMIN — PANTOPRAZOLE SODIUM 40 MILLIGRAM(S): 20 TABLET, DELAYED RELEASE ORAL at 05:51

## 2021-06-24 RX ADMIN — Medication 1 GRAM(S): at 23:58

## 2021-06-24 RX ADMIN — PANTOPRAZOLE SODIUM 40 MILLIGRAM(S): 20 TABLET, DELAYED RELEASE ORAL at 17:40

## 2021-06-24 RX ADMIN — Medication 100 MILLIGRAM(S): at 17:40

## 2021-06-24 RX ADMIN — Medication 1 GRAM(S): at 17:40

## 2021-06-24 NOTE — PROGRESS NOTE ADULT - ASSESSMENT
29 y/o male PMHx GERD, gastritis, open appendectomy 5 years ago c/o right-sided abdominal pain since Sunday.  US showed No cholelithiasis. Nonspecific borderline gallbladder wall thickening.  CT showed Asymmetric left-sided wall thickening of the rectosigmoid junction. Diffuse hepatic steatosis.      Plan:  - Clears ADAT  - Rec GI consult  - Medical management  - No surgical intervention needed, reconsult as needed  - Pt seen and examined with Dr. Cook 29 y/o male PMHx GERD, gastritis, open appendectomy 5 years ago c/o right-sided abdominal pain since Sunday.  US showed No cholelithiasis. Nonspecific borderline gallbladder wall thickening.  CT showed Asymmetric left-sided wall thickening of the rectosigmoid junction. Diffuse hepatic steatosis.      Plan:  - Clears ADAT  - Rec GI consult  - Medical management  - HIDA negative  - No surgical intervention needed, reconsult as needed  - Pt seen and examined with Dr. Cook

## 2021-06-24 NOTE — PROGRESS NOTE ADULT - SUBJECTIVE AND OBJECTIVE BOX
Patient: GUIDO JETT [id number] 30y Male                            Hospitalist Attending Note    Seen with RN.  Denies hallucinations.   No Abdominal pain, nausea, vomiting, diarrhea, nor constipation.     ____________________PHYSICAL EXAM:  GENERAL:  NAD Alert and Oriented x 3   HEENT: NCAT  CARDIOVASCULAR:  S1, S2  LUNGS: CTAB  ABDOMEN:  soft, (-) tenderness, (-) distension, (+) bowel sounds, (-) guarding, (-) rebound (-) rigidity  EXTREMITIES:  no cyanosis / clubbing / edema.   ____________________    VITALS:  Vital Signs Last 24 Hrs  T(C): 36.9 (2021 17:), Max: 36.9 (2021 17:)  T(F): 98.5 (:), Max: 98.5 (2021 17:)  HR: 94 (:) (60 - 94)  BP: 122/79 (2021 17:01) (109/70 - 130/89)  BP(mean): --  RR: 20 (2021 17:) (18 - 20)  SpO2: 98% (2021 17:) (97% - 99%) Daily     Daily Weight in k.4 (2021 06:10)  CAPILLARY BLOOD GLUCOSE        I&O's Summary    2021 07:01  -  2021 07:00  --------------------------------------------------------  IN: 1550 mL / OUT: 1450 mL / NET: 100 mL    2021 07:01  -  2021 17:46  --------------------------------------------------------  IN: 0 mL / OUT: 350 mL / NET: -350 mL        LABS:                        14.3   5.71  )-----------( 162      ( 2021 05:34 )             40.1     -    137  |  105  |  8   ----------------------------<  76  3.6   |  24  |  0.62    Ca    8.2<L>      2021 06:48  Phos  2.8       Mg     2.0         TPro  6.9  /  Alb  3.4  /  TBili  2.7<H>  /  DBili  x   /  AST  206<H>  /  ALT  203<H>  /  AlkPhos  98  06-24    PT/INR - ( 2021 06:48 )   PT: 13.4 sec;   INR: 1.16 ratio         PTT - ( 2021 10:42 )  PTT:30.0 sec  LIVER FUNCTIONS - ( 2021 06:48 )  Alb: 3.4 g/dL / Pro: 6.9 gm/dL / ALK PHOS: 98 U/L / ALT: 203 U/L / AST: 206 U/L / GGT: x           Urinalysis Basic - ( 2021 21:18 )    Color: Yellow / Appearance: Clear / S.010 / pH: x  Gluc: x / Ketone: Trace  / Bili: Negative / Urobili: Negative mg/dL   Blood: x / Protein: Negative mg/dL / Nitrite: Negative   Leuk Esterase: Negative / RBC: Negative /HPF / WBC Negative   Sq Epi: x / Non Sq Epi: Occasional / Bacteria: Negative      CARDIAC MARKERS ( 2021 21:18 )  .015 ng/mL / x     / x     / x     / x            Culture - Urine (collected 2021 08:28)  Source: .Urine Clean Catch (Midstream)  Final Report (2021 07:11):    <10,000 CFU/mL Normal Urogenital Karolyn        MEDICATIONS:  aluminum hydroxide/magnesium hydroxide/simethicone Suspension 30 milliLiter(s) Oral every 4 hours PRN  folic acid 1 milliGRAM(s) Oral daily  haloperidol    Injectable 5 milliGRAM(s) IntraMuscular every 6 hours PRN  LORazepam     Tablet 1 milliGRAM(s) Oral at bedtime  LORazepam     Tablet 0.5 milliGRAM(s) Oral <User Schedule>  LORazepam   Injectable 1 milliGRAM(s) IV Push every 2 hours PRN  LORazepam   Injectable 1 milliGRAM(s) IV Push every 1 hour PRN  ondansetron Injectable 4 milliGRAM(s) IV Push every 6 hours PRN  pantoprazole    Tablet 40 milliGRAM(s) Oral two times a day  polyethylene glycol 3350 17 Gram(s) Oral daily  sodium chloride 0.9%. 1000 milliLiter(s) IV Continuous <Continuous>  sucralfate 1 Gram(s) Oral four times a day  thiamine 100 milliGRAM(s) Oral daily  traZODone 50 milliGRAM(s) Oral at bedtime PRN

## 2021-06-24 NOTE — PROGRESS NOTE ADULT - SUBJECTIVE AND OBJECTIVE BOX
SUBJECTIVE: Pt seen and examined at bedside. No overnight events.  +abdominal pain  Denies abdominal pain  Pt denies n/v, sob, cp, or palpitations    Vital Signs Last 24 Hrs  T(C): 36.7 (2021 11:15), Max: 36.7 (2021 11:15)  T(F): 98 (2021 11:15), Max: 98 (2021 11:15)  HR: 60 (2021 11:15) (60 - 92)  BP: 130/86 (2021 11:15) (109/70 - 130/89)  BP(mean): --  RR: 18 (2021 11:15) (18 - 18)  SpO2: 99% (2021 11:15) (97% - 99%)  I&O's Summary    2021 07:01  -  2021 07:00  --------------------------------------------------------  IN: 1550 mL / OUT: 1450 mL / NET: 100 mL      I&O's Detail    2021 07:01  -  2021 07:00  --------------------------------------------------------  IN:    Oral Fluid: 350 mL    sodium chloride 0.9%: 1200 mL  Total IN: 1550 mL    OUT:    Voided (mL): 1450 mL  Total OUT: 1450 mL    Total NET: 100 mL          Labs:                         14.3   5.71  )-----------( 162      ( 2021 05:34 )             40.1     06-24    137  |  105  |  8   ----------------------------<  76  3.6   |  24  |  0.62    Ca    8.2<L>      2021 06:48  Phos  2.8     06-23  Mg     2.0     06-23    TPro  6.9  /  Alb  3.4  /  TBili  2.7<H>  /  DBili  x   /  AST  206<H>  /  ALT  203<H>  /  AlkPhos  98  -    PT/INR - ( 2021 06:48 )   PT: 13.4 sec;   INR: 1.16 ratio         PTT - ( 2021 10:42 )  PTT:30.0 sec  Urinalysis Basic - ( 2021 21:18 )    Color: Yellow / Appearance: Clear / S.010 / pH: x  Gluc: x / Ketone: Trace  / Bili: Negative / Urobili: Negative mg/dL   Blood: x / Protein: Negative mg/dL / Nitrite: Negative   Leuk Esterase: Negative / RBC: Negative /HPF / WBC Negative   Sq Epi: x / Non Sq Epi: Occasional / Bacteria: Negative        Physical Exam:  General Appearance: NAD, A&O x3  Cards: s1/s2 no murmurs  Pulm: CTA B/L, no rales  Abdomen: soft, nt/nd, no rebound/guarding  ext: soft, no calf tenderness, FROM

## 2021-06-24 NOTE — CONSULT NOTE ADULT - SUBJECTIVE AND OBJECTIVE BOX
HPI:       29 y/o male w/ PMHx of GERD presents to the ED c/o abdominal pain. Pt reports nausea and nonbilious/nonbloody emesis began 2 days ago then he noted epigastric as well as RUQ abdominal pain, which has mainly been constant. Pt also c/o feeling constipated, last BM 4 days. Of note pt reports 2 weeks ago was the anniversary of his Father's death and for 12 days straight drank about 1L of vodka daily because he has been very depressed. Pt reports SI however states he has not been drinking to kill himself, just "trying to help with the anxiness". Pt reports he is on antidepressants which he stopped taking when he started drinking. Pt reports this has happened in the past; has had periods of binge drinking when his mood get bad then stops when he starts feeling better. Pt reports last drink 2 days ago as he has been unable to keep anything down.       In ED, initial vitals stable. Labs sig for Na 132, K 3.4, TB 3.9, AST//145, D Bili 0.41. CT A/P Asymmetric left-sided wall thickening of the rectosigmoid junction. Correlation with colonoscopy suggested for further evaluation. No small bowel obstruction. Diffuse hepatic steatosis. RUQ w/ nonsp GB wall thickening. Surgery consulted.  (2021 05:05)  ------------- As Above ---------------- patient is a poor historian- sleepy  The patient states that he was in his USOH until two days prior to admission when he develoepd N/V and upper abdominal pain. Characterized as (?). Denies NSAIDs. 1 liter vodka qd x 12 days. Blood tinged vomitus.  Denies any prior episodes  With exception of GERD symptoms on Pantoprazole, he denies any GI symptoms  Patient states that he is ready to go home. He also states that he tolerated a clear liquid diet for breakfast      PAST MEDICAL & SURGICAL HISTORY:  Gastritis    Acid reflux    History of appendectomy        MEDICATIONS  (STANDING):  folic acid 1 milliGRAM(s) Oral daily  LORazepam     Tablet 1 milliGRAM(s) Oral at bedtime  LORazepam     Tablet 0.5 milliGRAM(s) Oral <User Schedule>  pantoprazole    Tablet 40 milliGRAM(s) Oral two times a day  polyethylene glycol 3350 17 Gram(s) Oral daily  sodium chloride 0.9%. 1000 milliLiter(s) (150 mL/Hr) IV Continuous <Continuous>  sucralfate 1 Gram(s) Oral four times a day  thiamine 100 milliGRAM(s) Oral daily    MEDICATIONS  (PRN):  aluminum hydroxide/magnesium hydroxide/simethicone Suspension 30 milliLiter(s) Oral every 4 hours PRN Dyspepsia  haloperidol    Injectable 5 milliGRAM(s) IntraMuscular every 6 hours PRN Agitation  LORazepam   Injectable 1 milliGRAM(s) IV Push every 2 hours PRN CIWA-Ar score increase by 2 points and a total score of 7 or less  LORazepam   Injectable 1 milliGRAM(s) IV Push every 1 hour PRN CIWA-Ar score 8 or greater  ondansetron Injectable 4 milliGRAM(s) IV Push every 6 hours PRN Nausea and/or Vomiting  traZODone 50 milliGRAM(s) Oral at bedtime PRN insomnia      Allergies    No Known Allergies    Intolerances        FAMILY HISTORY:  No pertinent family history in first degree relatives        REVIEW OF SYSTEMS:    CONSTITUTIONAL: No fever, weight loss,  EYES: No eye pain, visual disturbances, or discharge  ENMT:  No difficulty hearing, tinnitus, vertigo; No sinus or throat pain  NECK: No pain or stiffness  BREASTS: No pain, masses, or nipple discharge  RESPIRATORY: No cough, wheezing, chills or hemoptysis; No shortness of breath  CARDIOVASCULAR: No chest pain, palpitations, dizziness, or leg swelling  GASTROINTESTINAL: see above   GENITOURINARY: No dysuria, frequency, hematuria, or incontinence  NEUROLOGICAL: No headaches, memory loss, loss of strength, numbness, or tremors  SKIN: No itching, burning, rashes, or lesions   LYMPH NODES: No enlarged glands  ENDOCRINE: No heat or cold intolerance; No hair loss  MUSCULOSKELETAL: No joint pain or swelling; No muscle, back, or extremity pain  PSYCHIATRIC: No depression, anxiety, mood swings, or difficulty sleeping  HEME/LYMPH: No easy bruising, or bleeding gums  ALLERGY AND IMMUNOLOGIC: No hives or eczema          SOCIAL HISTORY:    FAMILY HISTORY:  No pertinent family history in first degree relatives        Vital Signs Last 24 Hrs  T(C): 36.7 (2021 11:15), Max: 36.7 (2021 11:15)  T(F): 98 (2021 11:15), Max: 98 (2021 11:15)  HR: 60 (2021 11:15) (60 - 92)  BP: 130/86 (2021 11:15) (109/70 - 130/89)  BP(mean): --  RR: 18 (2021 11:15) (18 - 18)  SpO2: 99% (2021 11:15) (97% - 99%)    PHYSICAL EXAM:    GENERAL: NAD, well-groomed, well-developed  HEAD:  Atraumatic, Normocephalic  EYES: EOMI, PERRLA, conjunctiva and sclera clear  NECK: Supple, No JVD, Normal thyroid  NERVOUS SYSTEM:  Alert & Oriented X3, Good concentration; Motor Strength 5/5 B/L upper and lower extremities;   CHEST/LUNG: Clear to percussion bilaterally; No rales, rhonchi, wheezing, or rubs  HEART: Regular rate and rhythm; No murmurs, rubs, or gallops  ABDOMEN: Soft, Nontender, Nondistended; Bowel sounds present  EXTREMITIES:  2+ Peripheral Pulses, No clubbing, cyanosis, or edema  LYMPH: No lymphadenopathy noted   RECTAL: Patient refused  SKIN: No rashes or lesions    LABS:                        14.3   5.71  )-----------( 162      ( 2021 05:34 )             40.1       CBC:   @ 05:34  WBC  5.71  HGB 14.3  HCT 40.1 Plate 162  MCV 85.1   @ 21:18  WBC  9.46  HGB 14.6  HCT 40.4 Plate 192  MCV 84.9           2021 06:48    137    |  105    |  8      ----------------------------<  76     3.6     |  24     |  0.62   2021 05:34    135    |  100    |  6      ----------------------------<  91     3.3     |  26     |  0.66   2021 21:18    133    |  96     |  7      ----------------------------<  93     3.4     |  26     |  0.66     Ca    8.2        2021 06:48  Ca    8.4        2021 05:34  Ca    8.6        2021 21:18  Phos  2.8       2021 05:34  Mg     2.0       2021 05:34    TPro  6.9    /  Alb  3.4    /  TBili  2.7    /  DBili  x      /  AST  206    /  ALT  203    /  AlkPhos  98     2021 06:48  TPro  7.4    /  Alb  3.8    /  TBili  4.1    /  DBili  x      /  AST  192    /  ALT  164    /  AlkPhos  96     2021 05:34  TPro  7.8    /  Alb  3.9    /  TBili  3.9    /  DBili  .41    /  AST  163    /  ALT  145    /  AlkPhos  96     2021 21:18    PT/INR - ( 2021 06:48 )   PT: 13.4 sec;   INR: 1.16 ratio         PTT - ( 2021 10:42 )  PTT:30.0 sec  Urinalysis Basic - ( 2021 21:18 )    Color: Yellow / Appearance: Clear / S.010 / pH: x  Gluc: x / Ketone: Trace  / Bili: Negative / Urobili: Negative mg/dL   Blood: x / Protein: Negative mg/dL / Nitrite: Negative   Leuk Esterase: Negative / RBC: Negative /HPF / WBC Negative   Sq Epi: x / Non Sq Epi: Occasional / Bacteria: Negative          RADIOLOGY & ADDITIONAL STUDIES:  < from: CT Abdomen and Pelvis w/ IV Cont (21 @ 22:22) >    EXAM:  CT ABDOMEN AND PELVIS IC                            *** ADDENDUM 2021  ***    The patient was recommended a colonoscopy to further evaluate the asymmetric wall thickening at the rectosigmoid junction.    The findings were discussed with Dr. SAPP  on 2021 12:26 AM.  Hospital policies for call back including read back policies were followed. The verbal communication call back supplements this written report.    *** END OF ADDENDUM 2021  ***      PROCEDURE DATE:  2021         INTERPRETATION:  CLINICAL INFORMATION: Abdominal pain    COMPARISON: There are no prior studies available for comparison.      TECHNIQUE: CT imaging of the abdomen and pelvis was performed with IV contrast.    CONTRAST/COMPLICATIONS:  IV Contrast: Omnipaque 350  75 cc administered   25 cc discarded  Oral Contrast: NONE  Complications: None reported at time of study completion    FINDINGS:    LOWER CHEST: within normal limits.    ABDOMEN:  LIVER: Diffuse hepatic steatosis.  BILE DUCTS: normal caliber.  GALLBLADDER: No calcified gallstones. Normal caliber wall.  PANCREAS: within normal limits.  SPLEEN: within normal limits.  ADRENALS: within normal limits.  KIDNEYS/URETERS: within normal limits.    PELVIS:  REPRODUCTIVE ORGANS: no pelvic masses.  BLADDER: within normal limits.      BOWEL: Portions of the gastrointestinal tract are collapsed, limiting evaluation. Asymmetric left-sided wall thickening of the rectosigmoid junction (sequence 2, image 121). No small bowel obstruction. The appendix is surgically absent.  PERITONEUM: no ascites or free air, no fluid collection.  VESSELS: Atherosclerotic changes  RETROPERITONEUM: within normal limits.  ABDOMINAL WALL: within normal limits.  BONES: within normal limits.    IMPRESSION:    Asymmetric left-sided wall thickening of the rectosigmoid junction. Correlation with colonoscopy suggested suggested for further evaluation. No small bowel obstruction.    Diffuse hepatic steatosis.        ***Please see the addendum at the top of this report. It may contain additional important information or changes.****        PEDRO EMERSON MD; Attending Radiologist  This document has been electronically signed. 2021 11:16PM  Addend:PEDRO EMERSON MD; Attending Radiologist  This addendum was electronically signed on: 2021 12:30AM.    < end of copied text >  < from: MR MRCP No Cont (21 @ 17:50) >    EXAM:  MR MRCP                            PROCEDURE DATE:  2021          INTERPRETATION:  CLINICAL INFORMATION: Gallbladder wall thickening on ultrasound    COMPARISON: CT abdomen and pelvis and gallbladder ultrasound 2021    CONTRAST/COMPLICATIONS:  IV Contrast: NONE  Oral Contrast: NONE  Complications: None reported at time of study completion    PROCEDURE:  MRI/MRCP was performed. Radial and 3D MRCP sequences were obtained.      FINDINGS:  LOWER CHEST: Clear.    LIVER: Diffuse steatosis.  BILE DUCTS: Nondilated. No choledocholithiasis.  GALLBLADDER: Normal. No stones, thickening, or fluid.  SPLEEN: Normal.  PANCREAS: Normal.  ADRENALS: Normal.  KIDNEYS/URETERS: No hydronephrosis.    VISUALIZED PORTIONS:  BOWEL: No bowel-related abnormality.  PERITONEUM: No ascites.  VESSELS: Normal caliber aorta.  RETROPERITONEUM/LYMPH NODES: No adenopathy.  ABDOMINAL WALL: Normal.  BONES: No aggressive lesion.    IMPRESSION:  *  Normal gallbladder without stones, thickening, or cholecystitis.            JOEY HUERTA MD; Attending Radiologist  This document has been electronically signed. 2021  6:29PM    < end of copied text >  < from: US Gallbladder (US Gallbladder .) (21 @ 23:40) >    EXAM:  US GALLBLADDER                            PROCEDURE DATE:  2021          INTERPRETATION:  CLINICAL INFORMATION: Right-sided abdominal pain    TECHNIQUE: Sonographic images of the abdomen was performed.    COMPARISON: CT of the abdomen and pelvis from the same day    FINDINGS:      GALLBLADDER: No gallstones or pericholecystic fluid. Nonspecific borderline gallbladder wall thickening. No sonographic Davis's sign.  CBD: Normal caliber, 3 mm.      IMPRESSION:  No cholelithiasis. Nonspecific borderline gallbladder wall thickening.    The findings were discussed with Dr. SAPP  on 2021 12:26 AM.  Hospital policies for call back including read back policies were followed. The verbal communication call back supplements this written report.            PEDRO EMERSON MD; Attending Radiologist  This document has been electronically signed. 2021 12:29AM    < end of copied text >

## 2021-06-24 NOTE — PROGRESS NOTE ADULT - ASSESSMENT
31 y/o male w/ PMHx of GERD presents to the ED c/o abdominal pain. Pt being admitted for abdominal pain, found to have elevated LFTs.  States his father  ~ 1 month ago.     # Elevated LFTs - Likely alcoholic hepatitis.  Bilirubin trending down.  Surgery input appreciated.  HIDA scan to r/o biliary obstruction.  ? GB wall thickening on CT / US.  HIDA scan urnemarkable.  Discussed with GI.  Monitor LFTs, coags.   # Rectosigmoid thickening - likely outpatient colonoscopy.  # Alcohol Abuse with withdrawal - counselled on cessation.  CIWA protocol.    # Hallucinations - may be due to above.  Psychiatry input appreciated.   # Acute Gastritis - continue PPI, sucralfate  # Depression - father  ~ 1 month ago.  Supportive care, see abov   # DVT Prophylaxis - OOB

## 2021-06-24 NOTE — CONSULT NOTE ADULT - ASSESSMENT
HPI:       29 y/o male w/ PMHx of GERD presents to the ED c/o abdominal pain. Pt reports nausea and nonbilious/nonbloody emesis began 2 days ago then he noted epigastric as well as RUQ abdominal pain, which has mainly been constant. Pt also c/o feeling constipated, last BM 4 days. Of note pt reports 2 weeks ago was the anniversary of his Father's death and for 12 days straight drank about 1L of vodka daily because he has been very depressed. Pt reports SI however states he has not been drinking to kill himself, just "trying to help with the anxiness". Pt reports he is on antidepressants which he stopped taking when he started drinking. Pt reports this has happened in the past; has had periods of binge drinking when his mood get bad then stops when he starts feeling better. Pt reports last drink 2 days ago as he has been unable to keep anything down.       In ED, initial vitals stable. Labs sig for Na 132, K 3.4, TB 3.9, AST//145, D Bili 0.41. CT A/P Asymmetric left-sided wall thickening of the rectosigmoid junction. Correlation with colonoscopy suggested for further evaluation. No small bowel obstruction. Diffuse hepatic steatosis. RUQ w/ nonsp GB wall thickening. Surgery consulted.  (23 Jun 2021 05:05)  ------------- As Above ---------------- patient is a poor historian- sleepy  The patient states that he was in his USOH until two days prior to admission when he develoepd N/V and upper abdominal pain. Characterized as (?). Denies NSAIDs. 1 liter vodka qd x 12 days. Blood tinged vomitus.  Denies any prior episodes  With exception of GERD symptoms on Pantoprazole, he denies any GI symptoms  Patient states that he is ready to go home. He also states that he tolerated a clear liquid diet for breakfast    A) N/V, abdominal pain - Probably secondary to alcoholic gastritis - Patient now asymptomatic - Tolerating clear liquid diet  1) Advance diet and observe 2) PPI / Carafate   B) Elevated LFTs - 3.9/163/145'96 --> 4.1/192/164/96 --> 2.7/206/203/98 - Bili mostly indirect , now improving. Probably alcoholic hepatitis - Supportive care for now  C) Asymmetric left sided wall thickening of rectum by CT Scan  -    HPI:       29 y/o male w/ PMHx of GERD presents to the ED c/o abdominal pain. Pt reports nausea and nonbilious/nonbloody emesis began 2 days ago then he noted epigastric as well as RUQ abdominal pain, which has mainly been constant. Pt also c/o feeling constipated, last BM 4 days. Of note pt reports 2 weeks ago was the anniversary of his Father's death and for 12 days straight drank about 1L of vodka daily because he has been very depressed. Pt reports SI however states he has not been drinking to kill himself, just "trying to help with the anxiness". Pt reports he is on antidepressants which he stopped taking when he started drinking. Pt reports this has happened in the past; has had periods of binge drinking when his mood get bad then stops when he starts feeling better. Pt reports last drink 2 days ago as he has been unable to keep anything down.       In ED, initial vitals stable. Labs sig for Na 132, K 3.4, TB 3.9, AST//145, D Bili 0.41. CT A/P Asymmetric left-sided wall thickening of the rectosigmoid junction. Correlation with colonoscopy suggested for further evaluation. No small bowel obstruction. Diffuse hepatic steatosis. RUQ w/ nonsp GB wall thickening. Surgery consulted.  (23 Jun 2021 05:05)  ------------- As Above ---------------- patient is a poor historian- sleepy  The patient states that he was in his USOH until two days prior to admission when he develoepd N/V and upper abdominal pain. Characterized as (?). Denies NSAIDs. 1 liter vodka qd x 12 days. Blood tinged vomitus.  Denies any prior episodes  With exception of GERD symptoms on Pantoprazole, he denies any GI symptoms  Patient states that he is ready to go home. He also states that he tolerated a clear liquid diet for breakfast    A) N/V, abdominal pain - Probably secondary to alcoholic gastritis - Patient now asymptomatic - Tolerating clear liquid diet  1) Advance diet and observe 2) PPI / Carafate   B) Elevated LFTs - 3.9/163/145'96 --> 4.1/192/164/96 --> 2.7/206/203/98 - Bili mostly indirect , now improving. Probably alcoholic hepatitis CMV (+), EBV (-) - 1) F/U labs  2) Viral hepatitis screen 3) Otherwise, Supportive care for now  C) Asymmetric left sided wall thickening of rectum by CT Scan  - Discussed with patient for 5 minutes. Patient wants to think about it before making any decisions If he wants a flex sig / colonoscopy, he will call my office. Name  / number given to patient. a copy of the CT scan was given to the patient.   HPI:       31 y/o male w/ PMHx of GERD presents to the ED c/o abdominal pain. Pt reports nausea and nonbilious/nonbloody emesis began 2 days ago then he noted epigastric as well as RUQ abdominal pain, which has mainly been constant. Pt also c/o feeling constipated, last BM 4 days. Of note pt reports 2 weeks ago was the anniversary of his Father's death and for 12 days straight drank about 1L of vodka daily because he has been very depressed. Pt reports SI however states he has not been drinking to kill himself, just "trying to help with the anxiness". Pt reports he is on antidepressants which he stopped taking when he started drinking. Pt reports this has happened in the past; has had periods of binge drinking when his mood get bad then stops when he starts feeling better. Pt reports last drink 2 days ago as he has been unable to keep anything down.       In ED, initial vitals stable. Labs sig for Na 132, K 3.4, TB 3.9, AST//145, D Bili 0.41. CT A/P Asymmetric left-sided wall thickening of the rectosigmoid junction. Correlation with colonoscopy suggested for further evaluation. No small bowel obstruction. Diffuse hepatic steatosis. RUQ w/ nonsp GB wall thickening. Surgery consulted.  (23 Jun 2021 05:05)  ------------- As Above ---------------- patient is a poor historian- sleepy  The patient states that he was in his USOH until two days prior to admission when he develoepd N/V and upper abdominal pain. Characterized as (?). Denies NSAIDs. 1 liter vodka qd x 12 days. Blood tinged vomitus.  Denies any prior episodes  With exception of GERD symptoms on Pantoprazole, he denies any GI symptoms  Patient states that he is ready to go home. He also states that he tolerated a clear liquid diet for breakfast    A) N/V, abdominal pain - Probably secondary to alcoholic gastritis - Patient now asymptomatic - See Ct scan, MRCP and ultrasound. Tolerating clear liquid diet  1) Advance diet and observe 2) PPI / Carafate   B) Elevated LFTs - 3.9/163/145'96 --> 4.1/192/164/96 --> 2.7/206/203/98 - Bili mostly indirect , now improving. Probably alcoholic hepatitis CMV (+), EBV (-) - 1) F/U labs  2) Viral hepatitis screen 3) Otherwise, Supportive care for now  C) Asymmetric left sided wall thickening of rectum by CT Scan  - Discussed with patient for 5 minutes. Patient wants to think about it before making any decisions If he wants a flex sig / colonoscopy, he will call my office. Name  / number given to patient. a copy of the CT scan was given to the patient.

## 2021-06-25 VITALS
HEART RATE: 85 BPM | SYSTOLIC BLOOD PRESSURE: 127 MMHG | RESPIRATION RATE: 19 BRPM | OXYGEN SATURATION: 98 % | DIASTOLIC BLOOD PRESSURE: 78 MMHG | TEMPERATURE: 98 F

## 2021-06-25 LAB
ALBUMIN SERPL ELPH-MCNC: 3.2 G/DL — LOW (ref 3.3–5)
ALP SERPL-CCNC: 84 U/L — SIGNIFICANT CHANGE UP (ref 40–120)
ALT FLD-CCNC: 184 U/L — HIGH (ref 12–78)
ANION GAP SERPL CALC-SCNC: 5 MMOL/L — SIGNIFICANT CHANGE UP (ref 5–17)
AST SERPL-CCNC: 166 U/L — HIGH (ref 15–37)
BILIRUB SERPL-MCNC: 1.3 MG/DL — HIGH (ref 0.2–1.2)
BUN SERPL-MCNC: 5 MG/DL — LOW (ref 7–23)
CALCIUM SERPL-MCNC: 8.1 MG/DL — LOW (ref 8.5–10.1)
CHLORIDE SERPL-SCNC: 105 MMOL/L — SIGNIFICANT CHANGE UP (ref 96–108)
CO2 SERPL-SCNC: 26 MMOL/L — SIGNIFICANT CHANGE UP (ref 22–31)
CREAT SERPL-MCNC: 0.63 MG/DL — SIGNIFICANT CHANGE UP (ref 0.5–1.3)
GLUCOSE SERPL-MCNC: 85 MG/DL — SIGNIFICANT CHANGE UP (ref 70–99)
HAV IGM SER-ACNC: SIGNIFICANT CHANGE UP
HBV CORE IGM SER-ACNC: SIGNIFICANT CHANGE UP
HBV SURFACE AG SER-ACNC: SIGNIFICANT CHANGE UP
HCV AB S/CO SERPL IA: 0.14 S/CO — SIGNIFICANT CHANGE UP (ref 0–0.99)
HCV AB SERPL-IMP: SIGNIFICANT CHANGE UP
POTASSIUM SERPL-MCNC: 3.5 MMOL/L — SIGNIFICANT CHANGE UP (ref 3.5–5.3)
POTASSIUM SERPL-SCNC: 3.5 MMOL/L — SIGNIFICANT CHANGE UP (ref 3.5–5.3)
PROT SERPL-MCNC: 6.6 GM/DL — SIGNIFICANT CHANGE UP (ref 6–8.3)
SODIUM SERPL-SCNC: 136 MMOL/L — SIGNIFICANT CHANGE UP (ref 135–145)

## 2021-06-25 PROCEDURE — 99231 SBSQ HOSP IP/OBS SF/LOW 25: CPT

## 2021-06-25 PROCEDURE — 99239 HOSP IP/OBS DSCHRG MGMT >30: CPT

## 2021-06-25 RX ORDER — FOLIC ACID 0.8 MG
1 TABLET ORAL
Qty: 0 | Refills: 0 | DISCHARGE
Start: 2021-06-25

## 2021-06-25 RX ORDER — NYSTATIN 500MM UNIT
5 POWDER (EA) MISCELLANEOUS
Qty: 100 | Refills: 0
Start: 2021-06-25 | End: 2021-06-29

## 2021-06-25 RX ORDER — OMEPRAZOLE 10 MG/1
1 CAPSULE, DELAYED RELEASE ORAL
Qty: 0 | Refills: 0 | DISCHARGE

## 2021-06-25 RX ORDER — PANTOPRAZOLE SODIUM 20 MG/1
1 TABLET, DELAYED RELEASE ORAL
Qty: 60 | Refills: 0
Start: 2021-06-25

## 2021-06-25 RX ORDER — NYSTATIN 500MM UNIT
500000 POWDER (EA) MISCELLANEOUS
Refills: 0 | Status: DISCONTINUED | OUTPATIENT
Start: 2021-06-25 | End: 2021-06-25

## 2021-06-25 RX ORDER — THIAMINE MONONITRATE (VIT B1) 100 MG
1 TABLET ORAL
Qty: 0 | Refills: 0 | DISCHARGE
Start: 2021-06-25

## 2021-06-25 RX ORDER — POTASSIUM CHLORIDE 20 MEQ
40 PACKET (EA) ORAL ONCE
Refills: 0 | Status: COMPLETED | OUTPATIENT
Start: 2021-06-25 | End: 2021-06-25

## 2021-06-25 RX ADMIN — PANTOPRAZOLE SODIUM 40 MILLIGRAM(S): 20 TABLET, DELAYED RELEASE ORAL at 05:13

## 2021-06-25 RX ADMIN — SODIUM CHLORIDE 150 MILLILITER(S): 9 INJECTION INTRAMUSCULAR; INTRAVENOUS; SUBCUTANEOUS at 05:13

## 2021-06-25 RX ADMIN — Medication 0.5 MILLIGRAM(S): at 08:51

## 2021-06-25 RX ADMIN — Medication 1 MILLIGRAM(S): at 12:31

## 2021-06-25 RX ADMIN — Medication 40 MILLIEQUIVALENT(S): at 12:27

## 2021-06-25 RX ADMIN — Medication 100 MILLIGRAM(S): at 12:29

## 2021-06-25 RX ADMIN — Medication 500000 UNIT(S): at 12:28

## 2021-06-25 RX ADMIN — Medication 1 GRAM(S): at 05:13

## 2021-06-25 RX ADMIN — Medication 1 GRAM(S): at 12:29

## 2021-06-25 NOTE — BH CONSULTATION LIAISON PROGRESS NOTE - NSBHASSESSMENTFT_PSY_ALL_CORE
29 yo male with binge pattern Alcohol Use Disorder, moderate, manifesting some alcohol withdrawal symptoms mainly manifesting as perceptual distortions (able to reality test) for which treatment is needed.   - currently has capacity to make his medical decisions.  
29 yo male with binge pattern Alcohol Use Disorder, moderate, manifesting some alcohol withdrawal symptoms mainly manifesting as perceptual distortions (able to reality test) for which treatment is needed.   - currently has capacity to make his medical decisions.

## 2021-06-25 NOTE — BH CONSULTATION LIAISON PROGRESS NOTE - CURRENT MEDICATION
MEDICATIONS  (STANDING):  folic acid 1 milliGRAM(s) Oral daily  LORazepam     Tablet 1 milliGRAM(s) Oral at bedtime  LORazepam     Tablet 0.5 milliGRAM(s) Oral <User Schedule>  pantoprazole    Tablet 40 milliGRAM(s) Oral two times a day  polyethylene glycol 3350 17 Gram(s) Oral daily  sodium chloride 0.9%. 1000 milliLiter(s) (150 mL/Hr) IV Continuous <Continuous>  sucralfate 1 Gram(s) Oral four times a day  thiamine 100 milliGRAM(s) Oral daily    MEDICATIONS  (PRN):  aluminum hydroxide/magnesium hydroxide/simethicone Suspension 30 milliLiter(s) Oral every 4 hours PRN Dyspepsia  haloperidol    Injectable 5 milliGRAM(s) IntraMuscular every 6 hours PRN Agitation  LORazepam   Injectable 1 milliGRAM(s) IV Push every 2 hours PRN CIWA-Ar score increase by 2 points and a total score of 7 or less  LORazepam   Injectable 1 milliGRAM(s) IV Push every 1 hour PRN CIWA-Ar score 8 or greater  ondansetron Injectable 4 milliGRAM(s) IV Push every 6 hours PRN Nausea and/or Vomiting  traZODone 50 milliGRAM(s) Oral at bedtime PRN insomnia  
MEDICATIONS  (STANDING):  folic acid 1 milliGRAM(s) Oral daily  nystatin    Suspension 061256 Unit(s) Swish and Swallow four times a day  pantoprazole    Tablet 40 milliGRAM(s) Oral two times a day  polyethylene glycol 3350 17 Gram(s) Oral daily  sodium chloride 0.9%. 1000 milliLiter(s) (150 mL/Hr) IV Continuous <Continuous>  sucralfate 1 Gram(s) Oral four times a day    MEDICATIONS  (PRN):  aluminum hydroxide/magnesium hydroxide/simethicone Suspension 30 milliLiter(s) Oral every 4 hours PRN Dyspepsia  haloperidol    Injectable 5 milliGRAM(s) IntraMuscular every 6 hours PRN Agitation  ondansetron Injectable 4 milliGRAM(s) IV Push every 6 hours PRN Nausea and/or Vomiting  traZODone 50 milliGRAM(s) Oral at bedtime PRN insomnia

## 2021-06-25 NOTE — PROGRESS NOTE ADULT - ASSESSMENT
31 y/o male w/ PMHx of GERD presents to the ED c/o abdominal pain. Pt being admitted for abdominal pain, found to have elevated LFTs.  States his father  ~ 1 month ago.  Abdominal CT / US suggested GB wall thickening.      # Elevated LFTs - Likely alcoholic hepatitis.  Bilirubin trending down.  Surgery input appreciated.  HIDA scan to r/o biliary obstruction.  Abdominal CT / US suggested GB wall thickening.  Discussed with GI.  Monitor LFTs, coags.   # Rectosigmoid thickening - Discussed need for outpatient colonoscopy.  # Alcohol Abuse with withdrawal - counselled on cessation.  CIWA protocol.    # Hallucinations - may be due to above.  Psychiatry input appreciated.   # Acute Gastritis - continue PPI, sucralfate.  Emphasized outpatient GI followup.  # Depression - father  ~ 1 month ago.  Supportive care, see above   # DVT Prophylaxis - OOB     Diet advanced.   Plan d/c home, outpatient f/u as above.    Emphasized EtOH cessation.  31 y/o male w/ PMHx of GERD presents to the ED c/o abdominal pain. Pt being admitted for abdominal pain, found to have elevated LFTs.  States his father  ~ 1 month ago.  Abdominal CT / US suggested GB wall thickening.      # Elevated LFTs - Likely alcoholic hepatitis.  Bilirubin trending down.  Surgery input appreciated.  HIDA scan to r/o biliary obstruction.  Abdominal CT / US suggested GB wall thickening.  Discussed with GI.  Monitor LFTs, coags.   # Rectosigmoid thickening - Discussed need for outpatient colonoscopy.  # Oral thrush - nystatin S&S on discharge.  Outpatient PMD followup.   # Alcohol Abuse with withdrawal - counselled on cessation.  CIWA protocol.    # Hallucinations - may be due to above.  Psychiatry input appreciated.   # Acute Gastritis - continue PPI, sucralfate.  Emphasized outpatient GI followup.  # Depression - father  ~ 1 month ago.  Supportive care, see above   # DVT Prophylaxis - OOB     Diet advanced.   Plan d/c home, outpatient f/u as above.    Emphasized EtOH cessation.

## 2021-06-25 NOTE — PROGRESS NOTE ADULT - ASSESSMENT
HPI:       29 y/o male w/ PMHx of GERD presents to the ED c/o abdominal pain. Pt reports nausea and nonbilious/nonbloody emesis began 2 days ago then he noted epigastric as well as RUQ abdominal pain, which has mainly been constant. Pt also c/o feeling constipated, last BM 4 days. Of note pt reports 2 weeks ago was the anniversary of his Father's death and for 12 days straight drank about 1L of vodka daily because he has been very depressed. Pt reports SI however states he has not been drinking to kill himself, just "trying to help with the anxiness". Pt reports he is on antidepressants which he stopped taking when he started drinking. Pt reports this has happened in the past; has had periods of binge drinking when his mood get bad then stops when he starts feeling better. Pt reports last drink 2 days ago as he has been unable to keep anything down.       In ED, initial vitals stable. Labs sig for Na 132, K 3.4, TB 3.9, AST//145, D Bili 0.41. CT A/P Asymmetric left-sided wall thickening of the rectosigmoid junction. Correlation with colonoscopy suggested for further evaluation. No small bowel obstruction. Diffuse hepatic steatosis. RUQ w/ nonsp GB wall thickening. Surgery consulted.  (23 Jun 2021 05:05)  ------------- As Above ---------------- patient is a poor historian- sleepy  The patient states that he was in his USOH until two days prior to admission when he develoepd N/V and upper abdominal pain. Characterized as (?). Denies NSAIDs. 1 liter vodka qd x 12 days. Blood tinged vomitus.  Denies any prior episodes  With exception of GERD symptoms on Pantoprazole, he denies any GI symptoms  Patient states that he is ready to go home. He also states that he tolerated a clear liquid diet for breakfast    A) N/V, abdominal pain - Probably secondary to alcoholic gastritis - Patient now asymptomatic - See Ct scan, MRCP and ultrasound. Tolerating full liquid diet  1) Advance diet and observe 2) PPI / Carafate   B) Elevated LFTs - better 3.9/163/145'96 --> 4.1/192/164/96 --> 2.7/206/203/98 - -> 1.3/160/184/84  Probably alcoholic hepatitis CMV (+), EBV (-) - 1) F/U labs  2) Viral hepatitis screen 3) Otherwise, Supportive care for now   C) /Asymmetric left sided wall thickening of rectum by CT Scan/ - Discussed with patient for 5 minutes. Patient wants to think about it before making any decisions If he wants a flex sig / colonoscopy, he will call my office. Name  / number /iven to patient. a copy of the CT scan was given to the patient.   HPI:       29 y/o male w/ PMHx of GERD presents to the ED c/o abdominal pain. Pt reports nausea and nonbilious/nonbloody emesis began 2 days ago then he noted epigastric as well as RUQ abdominal pain, which has mainly been constant. Pt also c/o feeling constipated, last BM 4 days. Of note pt reports 2 weeks ago was the anniversary of his Father's death and for 12 days straight drank about 1L of vodka daily because he has been very depressed. Pt reports SI however states he has not been drinking to kill himself, just "trying to help with the anxiness". Pt reports he is on antidepressants which he stopped taking when he started drinking. Pt reports this has happened in the past; has had periods of binge drinking when his mood get bad then stops when he starts feeling better. Pt reports last drink 2 days ago as he has been unable to keep anything down.       In ED, initial vitals stable. Labs sig for Na 132, K 3.4, TB 3.9, AST//145, D Bili 0.41. CT A/P Asymmetric left-sided wall thickening of the rectosigmoid junction. Correlation with colonoscopy suggested for further evaluation. No small bowel obstruction. Diffuse hepatic steatosis. RUQ w/ nonsp GB wall thickening. Surgery consulted.  (23 Jun 2021 05:05)  ------------- As Above ---------------- patient is a poor historian- sleepy  The patient states that he was in his USOH until two days prior to admission when he develoepd N/V and upper abdominal pain. Characterized as (?). Denies NSAIDs. 1 liter vodka qd x 12 days. Blood tinged vomitus.  Denies any prior episodes  With exception of GERD symptoms on Pantoprazole, he denies any GI symptoms  Patient states that he is ready to go home. He also states that he tolerated a clear liquid diet for breakfast    A) N/V, abdominal pain - Probably secondary to alcoholic gastritis - Patient now asymptomatic - See Ct scan, MRCP and ultrasound. Tolerating full liquid diet  1) Advance diet and observe 2) PPI / Carafate   B) Elevated LFTs - better 3.9/163/145'96 --> 4.1/192/164/96 --> 2.7/206/203/98 - -> 1.3/160/184/84  Probably alcoholic hepatitis CMV (+), EBV (-) Viral (-) - 1) F/U labs  2) Otherwise, Supportive care for now   C) Asymmetric left sided wall thickening of rectum by CT Scan/ - Discussed with patient for 5 minutes yesterday. Patient wants to think about it before making any decisions If he wants a flex sig / colonoscopy, he will call my office. Name  / number given to patient. A copy of the CT scan was given to the patient.

## 2021-06-25 NOTE — DISCHARGE NOTE PROVIDER - NSDCMRMEDTOKEN_GEN_ALL_CORE_FT
folic acid 1 mg oral tablet: 1 tab(s) orally once a day  nystatin 100,000 units/mL oral suspension: 5 milliliter(s) orally 4 times a day  pantoprazole 40 mg oral delayed release tablet: 1 tab(s) orally 2 times a day  thiamine 100 mg oral tablet: 1 tab(s) orally once a day

## 2021-06-25 NOTE — DISCHARGE NOTE PROVIDER - HOSPITAL COURSE
29 y/o male w/ PMHx of GERD presents to the ED c/o abdominal pain. Pt being admitted for abdominal pain, found to have elevated LFTs.  States his father  ~ 1 month ago.  Abdominal CT / US suggested GB wall thickening.      # Elevated LFTs - Likely alcoholic hepatitis.  Bilirubin trending down.  Surgery input appreciated.  HIDA scan to r/o biliary obstruction.  Abdominal CT / US suggested GB wall thickening.  Discussed with GI.  Monitor LFTs, coags.   # Rectosigmoid thickening - Discussed need for outpatient colonoscopy.  # Oral thrush - nystatin S&S on discharge.  Outpatient PMD followup.   # Alcohol Abuse with withdrawal - counselled on cessation.  CIWA protocol.    # Hallucinations - may be due to above.  Psychiatry input appreciated.   # Acute Gastritis - continue PPI, sucralfate.  Emphasized outpatient GI followup.  # Depression - father  ~ 1 month ago.  Supportive care, see above   # DVT Prophylaxis - OOB     Diet advanced.   Plan d/c home, outpatient f/u as above.    Emphasized EtOH cessation.     Disposition: Stable for discharge.  Outpatient followup discussed.  Total time spent on discharge is  40 minutes.

## 2021-06-25 NOTE — DISCHARGE NOTE PROVIDER - NSDCCPCAREPLAN_GEN_ALL_CORE_FT
PRINCIPAL DISCHARGE DIAGNOSIS  Diagnosis: Alcoholic cirrhosis  Assessment and Plan of Treatment:       SECONDARY DISCHARGE DIAGNOSES  Diagnosis: Alcohol withdrawal  Assessment and Plan of Treatment:     Diagnosis: Auditory hallucinations  Assessment and Plan of Treatment:     Diagnosis: Abnormal CT scan, colon  Assessment and Plan of Treatment:

## 2021-06-25 NOTE — PROGRESS NOTE ADULT - SUBJECTIVE AND OBJECTIVE BOX
Patient: GUIDO JETT [id number] 30y Male                            Hospitalist Attending Note    Seen with RN.  Denies hallucinations.  Tolerating po intake.   No Abdominal pain, nausea, vomiting, diarrhea, nor constipation.     ____________________PHYSICAL EXAM:  GENERAL:  NAD Alert and Oriented x 3   HEENT: NCAT  CARDIOVASCULAR:  S1, S2  LUNGS: CTAB  ABDOMEN:  soft, (-) tenderness, (-) distension, (+) bowel sounds, (-) guarding, (-) rebound (-) rigidity  EXTREMITIES:  no cyanosis / clubbing / edema.   ____________________    VITALS:  Vital Signs Last 24 Hrs  T(C): 36.6 (2021 11:48), Max: 36.9 (2021 17:01)  T(F): 97.8 (2021 11:48), Max: 98.5 (2021 17:01)  HR: 85 (2021 11:48) (73 - 94)  BP: 127/78 (2021 11:48) (114/74 - 127/78)  BP(mean): --  RR: 19 (2021 11:48) (18 - 20)  SpO2: 98% (2021 11:48) (98% - 98%) Daily     Daily Weight in k.1 (2021 05:12)  CAPILLARY BLOOD GLUCOSE        I&O's Summary    2021 07:01  -  2021 07:00  --------------------------------------------------------  IN: 1650 mL / OUT: 2050 mL / NET: -400 mL        LABS:        136  |  105  |  5<L>  ----------------------------<  85  3.5   |  26  |  0.63    Ca    8.1<L>      2021 05:57    TPro  6.6  /  Alb  3.2<L>  /  TBili  1.3<H>  /  DBili  x   /  AST  166<H>  /  ALT  184<H>  /  AlkPhos  84  06-25    PT/INR - ( 2021 06:48 )   PT: 13.4 sec;   INR: 1.16 ratio           LIVER FUNCTIONS - ( 2021 05:57 )  Alb: 3.2 g/dL / Pro: 6.6 gm/dL / ALK PHOS: 84 U/L / ALT: 184 U/L / AST: 166 U/L / GGT: x                   Culture - Urine (collected 2021 08:28)  Source: .Urine Clean Catch (Midstream)  Final Report (2021 07:11):    <10,000 CFU/mL Normal Urogenital Karolyn        MEDICATIONS:  aluminum hydroxide/magnesium hydroxide/simethicone Suspension 30 milliLiter(s) Oral every 4 hours PRN  folic acid 1 milliGRAM(s) Oral daily  haloperidol    Injectable 5 milliGRAM(s) IntraMuscular every 6 hours PRN  nystatin    Suspension 611674 Unit(s) Swish and Swallow four times a day  ondansetron Injectable 4 milliGRAM(s) IV Push every 6 hours PRN  pantoprazole    Tablet 40 milliGRAM(s) Oral two times a day  polyethylene glycol 3350 17 Gram(s) Oral daily  potassium chloride    Tablet ER 40 milliEquivalent(s) Oral once  sodium chloride 0.9%. 1000 milliLiter(s) IV Continuous <Continuous>  sucralfate 1 Gram(s) Oral four times a day  thiamine 100 milliGRAM(s) Oral daily  traZODone 50 milliGRAM(s) Oral at bedtime PRN                               Patient: GUIDO JETT [id number] 30y Male                            Hospitalist Attending Note    Seen with RN.  Denies hallucinations.  Tolerating po intake.   No Abdominal pain, nausea, vomiting, diarrhea, nor constipation.     ____________________PHYSICAL EXAM:  GENERAL:  NAD Alert and Oriented x 3   HEENT: NCAT.  Mild thrush.   CARDIOVASCULAR:  S1, S2  LUNGS: CTAB  ABDOMEN:  soft, (-) tenderness, (-) distension, (+) bowel sounds, (-) guarding, (-) rebound (-) rigidity  EXTREMITIES:  no cyanosis / clubbing / edema.   ____________________    VITALS:  Vital Signs Last 24 Hrs  T(C): 36.6 (2021 11:48), Max: 36.9 (2021 17:01)  T(F): 97.8 (2021 11:48), Max: 98.5 (2021 17:01)  HR: 85 (2021 11:48) (73 - 94)  BP: 127/78 (2021 11:48) (114/74 - 127/78)  BP(mean): --  RR: 19 (2021 11:48) (18 - 20)  SpO2: 98% (2021 11:48) (98% - 98%) Daily     Daily Weight in k.1 (2021 05:12)  CAPILLARY BLOOD GLUCOSE        I&O's Summary    2021 07:01  -  2021 07:00  --------------------------------------------------------  IN: 1650 mL / OUT: 2050 mL / NET: -400 mL        LABS:        136  |  105  |  5<L>  ----------------------------<  85  3.5   |  26  |  0.63    Ca    8.1<L>      2021 05:57    TPro  6.6  /  Alb  3.2<L>  /  TBili  1.3<H>  /  DBili  x   /  AST  166<H>  /  ALT  184<H>  /  AlkPhos  84  06-25    PT/INR - ( 2021 06:48 )   PT: 13.4 sec;   INR: 1.16 ratio           LIVER FUNCTIONS - ( 2021 05:57 )  Alb: 3.2 g/dL / Pro: 6.6 gm/dL / ALK PHOS: 84 U/L / ALT: 184 U/L / AST: 166 U/L / GGT: x                   Culture - Urine (collected 2021 08:28)  Source: .Urine Clean Catch (Midstream)  Final Report (2021 07:11):    <10,000 CFU/mL Normal Urogenital Karolyn        MEDICATIONS:  aluminum hydroxide/magnesium hydroxide/simethicone Suspension 30 milliLiter(s) Oral every 4 hours PRN  folic acid 1 milliGRAM(s) Oral daily  haloperidol    Injectable 5 milliGRAM(s) IntraMuscular every 6 hours PRN  nystatin    Suspension 061676 Unit(s) Swish and Swallow four times a day  ondansetron Injectable 4 milliGRAM(s) IV Push every 6 hours PRN  pantoprazole    Tablet 40 milliGRAM(s) Oral two times a day  polyethylene glycol 3350 17 Gram(s) Oral daily  potassium chloride    Tablet ER 40 milliEquivalent(s) Oral once  sodium chloride 0.9%. 1000 milliLiter(s) IV Continuous <Continuous>  sucralfate 1 Gram(s) Oral four times a day  thiamine 100 milliGRAM(s) Oral daily  traZODone 50 milliGRAM(s) Oral at bedtime PRN

## 2021-06-25 NOTE — BH CONSULTATION LIAISON PROGRESS NOTE - NSBHCONSULTSUBSTANCEALCOHOL_PSY_A_CORE FT
6/23/21 Ativan 1mg Po in am, in the afternoon and 2mg PO qhs; then continue to taper down & cover with symptom triggered CIWA. Discussed plan of care with Patient who was in agreement. 6/24/21: reduce to Ativan 0.5mg PO in 8am, 2pm and 1mg PO qhs then stop. 6/25/21: completed taper 
6/23/21 Ativan 1mg Po in am, in the afternoon and 2mg PO qhs; then continue to taper down & cover with symptom triggered CIWA. Discussed plan of care with Patient who was in agreement   6/24/21: reduce to Ativan 0.5mg PO in 8am, 2pm and 1mg PO qhs then stop

## 2021-06-25 NOTE — BH CONSULTATION LIAISON PROGRESS NOTE - NSBHCHARTREVIEWVS_PSY_A_CORE FT
Vital Signs Last 24 Hrs  T(C): 36.6 (25 Jun 2021 11:48), Max: 36.9 (24 Jun 2021 17:01)  T(F): 97.8 (25 Jun 2021 11:48), Max: 98.5 (24 Jun 2021 17:01)  HR: 85 (25 Jun 2021 11:48) (73 - 94)  BP: 127/78 (25 Jun 2021 11:48) (114/74 - 127/78)  BP(mean): --  RR: 19 (25 Jun 2021 11:48) (18 - 20)  SpO2: 98% (25 Jun 2021 11:48) (98% - 98%)
Vital Signs Last 24 Hrs  T(C): 36.7 (24 Jun 2021 11:15), Max: 36.7 (24 Jun 2021 11:15)  T(F): 98 (24 Jun 2021 11:15), Max: 98 (24 Jun 2021 11:15)  HR: 60 (24 Jun 2021 11:15) (60 - 92)  BP: 130/86 (24 Jun 2021 11:15) (109/70 - 130/89)  BP(mean): --  RR: 18 (24 Jun 2021 11:15) (18 - 18)  SpO2: 99% (24 Jun 2021 11:15) (97% - 99%)

## 2021-06-25 NOTE — BH CONSULTATION LIAISON PROGRESS NOTE - NSBHCHARTREVIEWLAB_PSY_A_CORE FT
06-25    136  |  105  |  5<L>  ----------------------------<  85  3.5   |  26  |  0.63    Ca    8.1<L>      25 Jun 2021 05:57    TPro  6.6  /  Alb  3.2<L>  /  TBili  1.3<H>  /  DBili  x   /  AST  166<H>  /  ALT  184<H>  /  AlkPhos  84  06-25  
06-24    137  |  105  |  8   ----------------------------<  76  3.6   |  24  |  0.62    Ca    8.2<L>      24 Jun 2021 06:48  Phos  2.8     06-23  Mg     2.0     06-23    TPro  6.9  /  Alb  3.4  /  TBili  2.7<H>  /  DBili  x   /  AST  206<H>  /  ALT  203<H>  /  AlkPhos  98  06-24

## 2021-06-25 NOTE — PROGRESS NOTE ADULT - SUBJECTIVE AND OBJECTIVE BOX
Patient is a 30y old  Male who presents with a chief complaint of Abdominal Pain (25 Jun 2021 12:17)      HPI:       31 y/o male w/ PMHx of GERD presents to the ED c/o abdominal pain. Pt reports nausea and nonbilious/nonbloody emesis began 2 days ago then he noted epigastric as well as RUQ abdominal pain, which has mainly been constant. Pt also c/o feeling constipated, last BM 4 days. Of note pt reports 2 weeks ago was the anniversary of his Father's death and for 12 days straight drank about 1L of vodka daily because he has been very depressed. Pt reports SI however states he has not been drinking to kill himself, just "trying to help with the anxiness". Pt reports he is on antidepressants which he stopped taking when he started drinking. Pt reports this has happened in the past; has had periods of binge drinking when his mood get bad then stops when he starts feeling better. Pt reports last drink 2 days ago as he has been unable to keep anything down.       In ED, initial vitals stable. Labs sig for Na 132, K 3.4, TB 3.9, AST//145, D Bili 0.41. CT A/P Asymmetric left-sided wall thickening of the rectosigmoid junction. Correlation with colonoscopy suggested for further evaluation. No small bowel obstruction. Diffuse hepatic steatosis. RUQ w/ nonsp GB wall thickening. Surgery consulted.  (23 Jun 2021 05:05)      INTERVAL HPI/OVERNIGHT EVENTS:  The patient denies melena, hematochezia, hematemesis, nausea, vomiting, abdominal pain, constipation, diarrhea, or change in bowel movements.  Tolerating full liquids    MEDICATIONS  (STANDING):  folic acid 1 milliGRAM(s) Oral daily  nystatin    Suspension 022004 Unit(s) Swish and Swallow four times a day  pantoprazole    Tablet 40 milliGRAM(s) Oral two times a day  polyethylene glycol 3350 17 Gram(s) Oral daily  sodium chloride 0.9%. 1000 milliLiter(s) (150 mL/Hr) IV Continuous <Continuous>  sucralfate 1 Gram(s) Oral four times a day    MEDICATIONS  (PRN):  aluminum hydroxide/magnesium hydroxide/simethicone Suspension 30 milliLiter(s) Oral every 4 hours PRN Dyspepsia  haloperidol    Injectable 5 milliGRAM(s) IntraMuscular every 6 hours PRN Agitation  ondansetron Injectable 4 milliGRAM(s) IV Push every 6 hours PRN Nausea and/or Vomiting  traZODone 50 milliGRAM(s) Oral at bedtime PRN insomnia      FAMILY HISTORY:  No pertinent family history in first degree relatives        Allergies    No Known Allergies    Intolerances        PMH/PSH:  Gastritis    Acid reflux    History of appendectomy          REVIEW OF SYSTEMS:  CONSTITUTIONAL: No fever, weight loss,  EYES: No eye pain, visual disturbances, or discharge  ENMT:  No difficulty hearing, tinnitus, vertigo; No sinus or throat pain  NECK: No pain or stiffness  BREASTS: No pain, masses, or nipple discharge  RESPIRATORY: No cough, wheezing, chills or hemoptysis; No shortness of breath  CARDIOVASCULAR: No chest pain, palpitations, dizziness, or leg swelling  GASTROINTESTINAL:  see above   GENITOURINARY: No dysuria, frequency, hematuria, or incontinence  NEUROLOGICAL: No headaches, memory loss, loss of strength, numbness, or tremors  SKIN: No itching, burning, rashes, or lesions   LYMPH NODES: No enlarged glands  ENDOCRINE: No heat or cold intolerance; No hair loss  MUSCULOSKELETAL: No joint pain or swelling; No muscle, back, or extremity pain  PSYCHIATRIC: No depression, anxiety, mood swings, or difficulty sleeping  HEME/LYMPH: No easy bruising, or bleeding gums  ALLERGY AND IMMUNOLOGIC: No hives or eczema    Vital Signs Last 24 Hrs  T(C): 36.6 (25 Jun 2021 11:48), Max: 36.9 (24 Jun 2021 17:01)  T(F): 97.8 (25 Jun 2021 11:48), Max: 98.5 (24 Jun 2021 17:01)  HR: 85 (25 Jun 2021 11:48) (73 - 94)  BP: 127/78 (25 Jun 2021 11:48) (114/74 - 127/78)  BP(mean): --  RR: 19 (25 Jun 2021 11:48) (18 - 20)  SpO2: 98% (25 Jun 2021 11:48) (98% - 98%)    PHYSICAL EXAM:  GENERAL: NAD, well-groomed, well-developed  HEAD:  Atraumatic, Normocephalic  EYES: EOMI, PERRLA, conjunctiva and sclera clear  NECK: Supple, No JVD, Normal thyroid  NERVOUS SYSTEM:  Alert & Oriented X3, Good concentration; Motor Strength 5/5 B/L upper and lower extremities;  CHEST/LUNG: Clear to percussion bilaterally; No rales, rhonchi, wheezing, or rubs  HEART: Regular rate and rhythm; No murmurs, rubs, or gallops  ABDOMEN: Soft, Nontender, Nondistended; Bowel sounds present  EXTREMITIES:  2+ Peripheral Pulses, No clubbing, cyanosis, or edema  LYMPH: No lymphadenopathy noted  SKIN: No rashes or lesions    LAB  06-22 @ 21:18  amylase --   lipase 54         CBC:  06-23 @ 05:34  WBC 5.71   Hgb 14.3   Hct 40.1   Plts 162  MCV 85.1  06-22 @ 21:18  WBC 9.46   Hgb 14.6   Hct 40.4   Plts 192  MCV 84.9      Chemistry:  06-25 @ 05:57  Na+ 136  K+ 3.5  Cl- 105  CO2 26  BUN 5  Cr 0.63     06-24 @ 06:48  Na+ 137  K+ 3.6  Cl- 105  CO2 24  BUN 8  Cr 0.62     06-23 @ 05:34  Na+ 135  K+ 3.3  Cl- 100  CO2 26  BUN 6  Cr 0.66     06-22 @ 21:18  Na+ 133  K+ 3.4  Cl- 96  CO2 26  BUN 7  Cr 0.66         Glucose, Serum: 85 mg/dL (06-25 @ 05:57)  Glucose, Serum: 76 mg/dL (06-24 @ 06:48)  Glucose, Serum: 91 mg/dL (06-23 @ 05:34)  Glucose, Serum: 93 mg/dL (06-22 @ 21:18)      25 Jun 2021 05:57    136    |  105    |  5      ----------------------------<  85     3.5     |  26     |  0.63   24 Jun 2021 06:48    137    |  105    |  8      ----------------------------<  76     3.6     |  24     |  0.62   23 Jun 2021 05:34    135    |  100    |  6      ----------------------------<  91     3.3     |  26     |  0.66   22 Jun 2021 21:18    133    |  96     |  7      ----------------------------<  93     3.4     |  26     |  0.66     Ca    8.1        25 Jun 2021 05:57  Ca    8.2        24 Jun 2021 06:48  Ca    8.4        23 Jun 2021 05:34  Ca    8.6        22 Jun 2021 21:18  Phos  2.8       23 Jun 2021 05:34  Mg     2.0       23 Jun 2021 05:34    TPro  6.6    /  Alb  3.2    /  TBili  1.3    /  DBili  x      /  AST  166    /  ALT  184    /  AlkPhos  84     25 Jun 2021 05:57  TPro  6.9    /  Alb  3.4    /  TBili  2.7    /  DBili  x      /  AST  206    /  ALT  203    /  AlkPhos  98     24 Jun 2021 06:48  TPro  7.4    /  Alb  3.8    /  TBili  4.1    /  DBili  x      /  AST  192    /  ALT  164    /  AlkPhos  96     23 Jun 2021 05:34  TPro  7.8    /  Alb  3.9    /  TBili  3.9    /  DBili  .41    /  AST  163    /  ALT  145    /  AlkPhos  96     22 Jun 2021 21:18      PT/INR - ( 24 Jun 2021 06:48 )   PT: 13.4 sec;   INR: 1.16 ratio                 CAPILLARY BLOOD GLUCOSE              RADIOLOGY & ADDITIONAL TESTS:    Imaging Personally Reviewed:  [ ] YES  [ ] NO    Consultant(s) Notes Reviewed:  [ ] YES  [ ] NO    Care Discussed with Consultants/Other Providers [ ] YES  [ ] NO

## 2021-06-25 NOTE — DISCHARGE NOTE PROVIDER - NSDCFUADDINST_GEN_ALL_CORE_FT
It is important to see your primary physician as well as the physicians noted below within the next week to perform a comprehensive medical review.  Call their offices for an appointment as soon as you leave the hospital.  You will also need to see them for renewal of your medications.  If you do not have a primary physician, contact the [facility] Physician Referral Service at [phone].  To obtain your results, you can access the [facility] Patient Portal at [email or web address].  Your medical issues appear to be stable at this time, but if your symptoms recur or worsen, contact your physicians and/or return to the hospital if necessary.  If you encounter any issues or questions with your medication, call your physicians before stopping the medication.  Do not drive.  Limit your diet to 2 grams of sodium daily.

## 2021-06-25 NOTE — DISCHARGE NOTE NURSING/CASE MANAGEMENT/SOCIAL WORK - PATIENT PORTAL LINK FT
You can access the FollowMyHealth Patient Portal offered by [facility] by registering at the following website: [email or web address]. By joining [facility] FollowMyHealth portal, you will also be able to view your health information using other applications (apps) compatible with our system.

## 2021-06-25 NOTE — BH CONSULTATION LIAISON PROGRESS NOTE - NSBHCONSULTRECOMMENDOTHER_PSY_A_CORE FT
would benefit from alcohol abuse counseling/services as an outpatient; referrals to be given to Patient on discharge 
would benefit from alcohol abuse counseling/services as an outpatient; referrals to be given to Patient on discharge

## 2021-06-25 NOTE — BH CONSULTATION LIAISON PROGRESS NOTE - NSBHFUPINTERVALHXFT_PSY_A_CORE
Patient did not need any Ativan PRNs for breakthrough alcohol withdrawal sxs after he was started on low dose standing Ativan. Patient slept well last night and slept in this morning. s/p MRCP yesterday afternoon - normal study as per report.       
Patient did not need any Ativan PRNs for breakthrough alcohol withdrawal sxs after he was started on low dose standing Ativan, which he completed last night. Patient slept well last night and slept in this morning. Eating meals well. No complaints. s/p NM hepatobiliary scan yesterday -normal study. Denies and does not manifest any alcohol withdrawal symptoms at this time. Patient at this time endorses stable euthymic mood and denies any symptoms of hypomania/tonia/psychosis/major depression/ anxiety/panic. Denies any active or passive suicidal or homicidal ideation. Names protective factors (deepa; family; hope for future). Denies illicit drug use; denies access to guns.

## 2021-07-02 DIAGNOSIS — F10.132: ICD-10-CM

## 2021-07-02 DIAGNOSIS — R93.3 ABNORMAL FINDINGS ON DIAGNOSTIC IMAGING OF OTHER PARTS OF DIGESTIVE TRACT: ICD-10-CM

## 2021-07-02 DIAGNOSIS — R45.851 SUICIDAL IDEATIONS: ICD-10-CM

## 2021-07-02 DIAGNOSIS — B37.0 CANDIDAL STOMATITIS: ICD-10-CM

## 2021-07-02 DIAGNOSIS — E80.6 OTHER DISORDERS OF BILIRUBIN METABOLISM: ICD-10-CM

## 2021-07-02 DIAGNOSIS — K21.9 GASTRO-ESOPHAGEAL REFLUX DISEASE WITHOUT ESOPHAGITIS: ICD-10-CM

## 2021-07-02 DIAGNOSIS — K70.30 ALCOHOLIC CIRRHOSIS OF LIVER WITHOUT ASCITES: ICD-10-CM

## 2021-07-02 DIAGNOSIS — F32.9 MAJOR DEPRESSIVE DISORDER, SINGLE EPISODE, UNSPECIFIED: ICD-10-CM

## 2021-07-02 DIAGNOSIS — K29.20 ALCOHOLIC GASTRITIS WITHOUT BLEEDING: ICD-10-CM

## 2022-09-12 NOTE — ED ADULT NURSE NOTE - OBJECTIVE STATEMENT
pt c/o R-flank pain radiating to mid abd x 2 days,  pain on urination,  last BM 6/21/21, pt states it was yellow and had difficulty going,  unable to pass gas [Negative] : Allergic/Immunologic